# Patient Record
Sex: MALE | Race: WHITE | NOT HISPANIC OR LATINO
[De-identification: names, ages, dates, MRNs, and addresses within clinical notes are randomized per-mention and may not be internally consistent; named-entity substitution may affect disease eponyms.]

---

## 2017-02-15 ENCOUNTER — TRANSCRIPTION ENCOUNTER (OUTPATIENT)
Age: 57
End: 2017-02-15

## 2017-09-04 ENCOUNTER — TRANSCRIPTION ENCOUNTER (OUTPATIENT)
Age: 57
End: 2017-09-04

## 2017-11-21 PROBLEM — Z00.00 ENCOUNTER FOR PREVENTIVE HEALTH EXAMINATION: Status: ACTIVE | Noted: 2017-11-21

## 2018-01-02 ENCOUNTER — APPOINTMENT (OUTPATIENT)
Dept: HEART AND VASCULAR | Facility: CLINIC | Age: 58
End: 2018-01-02
Payer: COMMERCIAL

## 2018-01-02 VITALS — DIASTOLIC BLOOD PRESSURE: 80 MMHG | SYSTOLIC BLOOD PRESSURE: 130 MMHG

## 2018-01-02 VITALS
WEIGHT: 184 LBS | HEART RATE: 72 BPM | BODY MASS INDEX: 24.39 KG/M2 | SYSTOLIC BLOOD PRESSURE: 128 MMHG | DIASTOLIC BLOOD PRESSURE: 80 MMHG | HEIGHT: 73 IN

## 2018-01-02 DIAGNOSIS — Z80.1 FAMILY HISTORY OF MALIGNANT NEOPLASM OF TRACHEA, BRONCHUS AND LUNG: ICD-10-CM

## 2018-01-02 DIAGNOSIS — Z82.49 FAMILY HISTORY OF ISCHEMIC HEART DISEASE AND OTHER DISEASES OF THE CIRCULATORY SYSTEM: ICD-10-CM

## 2018-01-02 DIAGNOSIS — I20.8 OTHER FORMS OF ANGINA PECTORIS: ICD-10-CM

## 2018-01-02 DIAGNOSIS — Z87.898 PERSONAL HISTORY OF OTHER SPECIFIED CONDITIONS: ICD-10-CM

## 2018-01-02 PROCEDURE — 93306 TTE W/DOPPLER COMPLETE: CPT

## 2018-01-02 PROCEDURE — 99204 OFFICE O/P NEW MOD 45 MIN: CPT | Mod: 25

## 2018-01-02 PROCEDURE — 93000 ELECTROCARDIOGRAM COMPLETE: CPT

## 2018-02-02 PROBLEM — Z82.49 FAMILY HISTORY OF PULMONARY EMBOLISM: Status: ACTIVE | Noted: 2018-02-02

## 2018-02-02 PROBLEM — Z80.1 FAMILY HISTORY OF LUNG CANCER: Status: ACTIVE | Noted: 2018-02-02

## 2018-02-02 PROBLEM — Z82.49 FAMILY HISTORY OF HYPERTENSION: Status: ACTIVE | Noted: 2018-02-02

## 2018-04-10 ENCOUNTER — APPOINTMENT (OUTPATIENT)
Dept: PULMONOLOGY | Facility: CLINIC | Age: 58
End: 2018-04-10
Payer: COMMERCIAL

## 2018-04-10 VITALS
BODY MASS INDEX: 24.39 KG/M2 | OXYGEN SATURATION: 95 % | TEMPERATURE: 98.2 F | SYSTOLIC BLOOD PRESSURE: 124 MMHG | DIASTOLIC BLOOD PRESSURE: 80 MMHG | WEIGHT: 184 LBS | HEART RATE: 85 BPM | HEIGHT: 73 IN

## 2018-04-10 DIAGNOSIS — Z86.79 PERSONAL HISTORY OF OTHER DISEASES OF THE CIRCULATORY SYSTEM: ICD-10-CM

## 2018-04-10 DIAGNOSIS — Z78.9 OTHER SPECIFIED HEALTH STATUS: ICD-10-CM

## 2018-04-10 DIAGNOSIS — Z86.39 PERSONAL HISTORY OF OTHER ENDOCRINE, NUTRITIONAL AND METABOLIC DISEASE: ICD-10-CM

## 2018-04-10 DIAGNOSIS — F17.200 NICOTINE DEPENDENCE, UNSPECIFIED, UNCOMPLICATED: ICD-10-CM

## 2018-04-10 PROCEDURE — 94060 EVALUATION OF WHEEZING: CPT

## 2018-04-10 PROCEDURE — 94729 DIFFUSING CAPACITY: CPT

## 2018-04-10 PROCEDURE — 99244 OFF/OP CNSLTJ NEW/EST MOD 40: CPT | Mod: 25

## 2018-04-10 PROCEDURE — 94727 GAS DIL/WSHOT DETER LNG VOL: CPT

## 2018-04-10 PROCEDURE — ZZZZZ: CPT

## 2018-05-01 ENCOUNTER — APPOINTMENT (OUTPATIENT)
Dept: PULMONOLOGY | Facility: CLINIC | Age: 58
End: 2018-05-01
Payer: COMMERCIAL

## 2018-05-01 ENCOUNTER — APPOINTMENT (OUTPATIENT)
Dept: SLEEP CENTER | Facility: HOME HEALTH | Age: 58
End: 2018-05-01

## 2018-05-01 ENCOUNTER — APPOINTMENT (OUTPATIENT)
Dept: CT IMAGING | Facility: HOSPITAL | Age: 58
End: 2018-05-01

## 2018-05-01 VITALS
OXYGEN SATURATION: 95 % | SYSTOLIC BLOOD PRESSURE: 144 MMHG | HEART RATE: 85 BPM | DIASTOLIC BLOOD PRESSURE: 92 MMHG | WEIGHT: 183 LBS | HEIGHT: 73 IN | BODY MASS INDEX: 24.25 KG/M2

## 2018-05-01 VITALS — WEIGHT: 183 LBS | BODY MASS INDEX: 24.14 KG/M2

## 2018-05-01 PROCEDURE — 99406 BEHAV CHNG SMOKING 3-10 MIN: CPT

## 2018-05-01 PROCEDURE — G0296 VISIT TO DETERM LDCT ELIG: CPT

## 2018-05-03 ENCOUNTER — FORM ENCOUNTER (OUTPATIENT)
Age: 58
End: 2018-05-03

## 2018-05-04 ENCOUNTER — OUTPATIENT (OUTPATIENT)
Dept: OUTPATIENT SERVICES | Facility: HOSPITAL | Age: 58
LOS: 1 days | End: 2018-05-04
Payer: COMMERCIAL

## 2018-05-04 ENCOUNTER — APPOINTMENT (OUTPATIENT)
Dept: CT IMAGING | Facility: HOSPITAL | Age: 58
End: 2018-05-04

## 2018-05-04 PROCEDURE — G0297: CPT

## 2018-05-04 PROCEDURE — G0297: CPT | Mod: 26

## 2018-05-25 ENCOUNTER — APPOINTMENT (OUTPATIENT)
Dept: SLEEP CENTER | Facility: HOSPITAL | Age: 58
End: 2018-05-25
Payer: COMMERCIAL

## 2018-05-25 ENCOUNTER — OUTPATIENT (OUTPATIENT)
Dept: OUTPATIENT SERVICES | Facility: HOSPITAL | Age: 58
LOS: 1 days | End: 2018-05-25
Payer: COMMERCIAL

## 2018-05-25 DIAGNOSIS — G47.33 OBSTRUCTIVE SLEEP APNEA (ADULT) (PEDIATRIC): ICD-10-CM

## 2018-05-25 PROCEDURE — 95810 POLYSOM 6/> YRS 4/> PARAM: CPT | Mod: 26

## 2018-05-25 PROCEDURE — 95810 POLYSOM 6/> YRS 4/> PARAM: CPT

## 2018-06-21 ENCOUNTER — OUTPATIENT (OUTPATIENT)
Dept: OUTPATIENT SERVICES | Facility: HOSPITAL | Age: 58
LOS: 1 days | End: 2018-06-21
Payer: COMMERCIAL

## 2018-06-21 ENCOUNTER — APPOINTMENT (OUTPATIENT)
Dept: MRI IMAGING | Facility: HOSPITAL | Age: 58
End: 2018-06-21
Payer: COMMERCIAL

## 2018-06-21 PROCEDURE — 73218 MRI UPPER EXTREMITY W/O DYE: CPT | Mod: 26,LT

## 2018-06-21 PROCEDURE — 73218 MRI UPPER EXTREMITY W/O DYE: CPT

## 2018-06-26 ENCOUNTER — APPOINTMENT (OUTPATIENT)
Dept: PULMONOLOGY | Facility: CLINIC | Age: 58
End: 2018-06-26
Payer: COMMERCIAL

## 2018-06-26 VITALS
HEIGHT: 73 IN | SYSTOLIC BLOOD PRESSURE: 144 MMHG | TEMPERATURE: 98.1 F | OXYGEN SATURATION: 96 % | HEART RATE: 85 BPM | BODY MASS INDEX: 24.52 KG/M2 | WEIGHT: 185 LBS | DIASTOLIC BLOOD PRESSURE: 82 MMHG

## 2018-06-26 DIAGNOSIS — J39.2 OTHER DISEASES OF PHARYNX: ICD-10-CM

## 2018-06-26 DIAGNOSIS — J31.0 CHRONIC RHINITIS: ICD-10-CM

## 2018-06-26 PROCEDURE — 94060 EVALUATION OF WHEEZING: CPT

## 2018-06-26 PROCEDURE — 99215 OFFICE O/P EST HI 40 MIN: CPT | Mod: 25

## 2018-06-26 PROCEDURE — 94729 DIFFUSING CAPACITY: CPT

## 2018-06-26 PROCEDURE — 36415 COLL VENOUS BLD VENIPUNCTURE: CPT

## 2018-09-09 ENCOUNTER — FORM ENCOUNTER (OUTPATIENT)
Age: 58
End: 2018-09-09

## 2018-09-10 ENCOUNTER — OUTPATIENT (OUTPATIENT)
Dept: OUTPATIENT SERVICES | Facility: HOSPITAL | Age: 58
LOS: 1 days | End: 2018-09-10
Payer: COMMERCIAL

## 2018-09-10 ENCOUNTER — APPOINTMENT (OUTPATIENT)
Dept: CT IMAGING | Facility: HOSPITAL | Age: 58
End: 2018-09-10
Payer: COMMERCIAL

## 2018-09-10 PROCEDURE — 71250 CT THORAX DX C-: CPT | Mod: 26

## 2018-09-10 PROCEDURE — 71250 CT THORAX DX C-: CPT

## 2018-09-11 ENCOUNTER — APPOINTMENT (OUTPATIENT)
Dept: PULMONOLOGY | Facility: CLINIC | Age: 58
End: 2018-09-11
Payer: COMMERCIAL

## 2018-09-11 VITALS
DIASTOLIC BLOOD PRESSURE: 80 MMHG | OXYGEN SATURATION: 93 % | BODY MASS INDEX: 23.91 KG/M2 | HEIGHT: 73 IN | SYSTOLIC BLOOD PRESSURE: 120 MMHG | TEMPERATURE: 98.9 F | HEART RATE: 85 BPM | WEIGHT: 180.38 LBS

## 2018-09-11 PROCEDURE — 99215 OFFICE O/P EST HI 40 MIN: CPT | Mod: 25

## 2018-09-11 PROCEDURE — 90686 IIV4 VACC NO PRSV 0.5 ML IM: CPT

## 2018-09-11 PROCEDURE — G0008: CPT

## 2018-12-11 ENCOUNTER — APPOINTMENT (OUTPATIENT)
Dept: MRI IMAGING | Facility: HOSPITAL | Age: 58
End: 2018-12-11

## 2018-12-11 ENCOUNTER — OUTPATIENT (OUTPATIENT)
Dept: OUTPATIENT SERVICES | Facility: HOSPITAL | Age: 58
LOS: 1 days | End: 2018-12-11
Payer: COMMERCIAL

## 2018-12-11 PROCEDURE — 72148 MRI LUMBAR SPINE W/O DYE: CPT

## 2018-12-11 PROCEDURE — 72148 MRI LUMBAR SPINE W/O DYE: CPT | Mod: 26

## 2018-12-18 ENCOUNTER — APPOINTMENT (OUTPATIENT)
Dept: PULMONOLOGY | Facility: CLINIC | Age: 58
End: 2018-12-18
Payer: COMMERCIAL

## 2018-12-18 VITALS
SYSTOLIC BLOOD PRESSURE: 110 MMHG | DIASTOLIC BLOOD PRESSURE: 80 MMHG | HEART RATE: 76 BPM | HEIGHT: 73 IN | TEMPERATURE: 98.7 F | WEIGHT: 180 LBS | OXYGEN SATURATION: 96 % | BODY MASS INDEX: 23.86 KG/M2

## 2018-12-18 PROCEDURE — 94060 EVALUATION OF WHEEZING: CPT

## 2018-12-18 PROCEDURE — 94729 DIFFUSING CAPACITY: CPT

## 2018-12-18 PROCEDURE — 99215 OFFICE O/P EST HI 40 MIN: CPT

## 2018-12-18 RX ORDER — BUDESONIDE AND FORMOTEROL FUMARATE DIHYDRATE 160; 4.5 UG/1; UG/1
160-4.5 AEROSOL RESPIRATORY (INHALATION)
Refills: 0 | Status: DISCONTINUED | COMMUNITY
Start: 2018-06-26 | End: 2018-12-18

## 2018-12-18 RX ORDER — FLUTICASONE PROPIONATE 50 UG/1
50 SPRAY, METERED NASAL TWICE DAILY
Qty: 1 | Refills: 1 | Status: DISCONTINUED | COMMUNITY
Start: 2018-06-26 | End: 2018-12-18

## 2018-12-18 RX ORDER — METHOTREXATE 2.5 MG/1
2.5 TABLET ORAL
Refills: 0 | Status: DISCONTINUED | COMMUNITY
End: 2018-12-18

## 2018-12-18 RX ORDER — FOLIC ACID 1 MG/1
1 TABLET ORAL
Refills: 0 | Status: DISCONTINUED | COMMUNITY
End: 2018-12-18

## 2018-12-18 RX ORDER — NICOTINE 21 MG/24H
21 PATCH, EXTENDED RELEASE TRANSDERMAL
Qty: 1 | Refills: 5 | Status: DISCONTINUED | COMMUNITY
Start: 2018-04-10 | End: 2018-12-18

## 2019-04-11 ENCOUNTER — NON-APPOINTMENT (OUTPATIENT)
Age: 59
End: 2019-04-11

## 2019-04-11 ENCOUNTER — APPOINTMENT (OUTPATIENT)
Dept: PULMONOLOGY | Facility: CLINIC | Age: 59
End: 2019-04-11
Payer: COMMERCIAL

## 2019-04-11 VITALS
TEMPERATURE: 99.3 F | WEIGHT: 180 LBS | SYSTOLIC BLOOD PRESSURE: 110 MMHG | OXYGEN SATURATION: 97 % | RESPIRATION RATE: 12 BRPM | HEART RATE: 70 BPM | DIASTOLIC BLOOD PRESSURE: 80 MMHG | HEIGHT: 73 IN | BODY MASS INDEX: 23.86 KG/M2

## 2019-04-11 PROCEDURE — 94010 BREATHING CAPACITY TEST: CPT

## 2019-04-11 PROCEDURE — 99215 OFFICE O/P EST HI 40 MIN: CPT | Mod: 25

## 2019-04-11 PROCEDURE — 36415 COLL VENOUS BLD VENIPUNCTURE: CPT

## 2019-04-11 RX ORDER — VARENICLINE TARTRATE 1 MG/1
1 TABLET, FILM COATED ORAL TWICE DAILY
Qty: 1 | Refills: 1 | Status: DISCONTINUED | COMMUNITY
Start: 2018-04-10 | End: 2019-04-11

## 2019-04-11 RX ORDER — VARENICLINE TARTRATE 0.5 (11)-1
0.5 MG X 11 & KIT ORAL
Qty: 1 | Refills: 0 | Status: DISCONTINUED | COMMUNITY
Start: 2018-04-10 | End: 2019-04-11

## 2019-04-14 LAB
CH50 SERPL-MCNC: 45 U/ML
CRP SERPL-MCNC: <0.1 MG/DL
ENA RNP AB SER IA-ACNC: <0.2 AL
ENA SM AB SER IA-ACNC: <0.2 AL
RHEUMATOID FACT SER QL: 226 IU/ML

## 2019-04-14 NOTE — PHYSICAL EXAM
[General Appearance - Well Developed] : well developed [Normal Appearance] : normal appearance [Well Groomed] : well groomed [General Appearance - Well Nourished] : well nourished [Normal Conjunctiva] : the conjunctiva exhibited no abnormalities [II] : II [Heart Rate And Rhythm] : heart rate and rhythm were normal [Heart Sounds] : normal S1 and S2 [Murmurs] : no murmurs present [Arterial Pulses Normal] : the arterial pulses were normal [Edema] : no peripheral edema present [Respiration, Rhythm And Depth] : normal respiratory rhythm and effort [Exaggerated Use Of Accessory Muscles For Inspiration] : no accessory muscle use [Auscultation Breath Sounds / Voice Sounds] : lungs were clear to auscultation bilaterally [Lungs Percussion] : the lungs were normal to percussion [Abdomen Soft] : soft [Bowel Sounds] : normal bowel sounds [Abnormal Walk] : normal gait [Gait - Sufficient For Exercise Testing] : the gait was sufficient for exercise testing [Nail Clubbing] : no clubbing of the fingernails [Cyanosis, Localized] : no localized cyanosis [Skin Color & Pigmentation] : normal skin color and pigmentation [] : no rash [No Focal Deficits] : no focal deficits [Oriented To Time, Place, And Person] : oriented to person, place, and time [FreeTextEntry1] : good air entry, no wheezing, rhonchi or crackles  [FreeTextEntry2] : No pedal edema

## 2019-04-14 NOTE — DISCUSSION/SUMMARY
[FreeTextEntry1] : Attending's summary:\par 4-11-19\par No cigarette smell noted \par -no pallor, no icterus\par -no oral thrush, no lesions noted, congestion in both nostrils with significant narrowing, no discharge\par -no cervical lymphadenopathy, no JVD at 45 degrees, no hepatojugular reflux\par -P2 not loud \par -good air entry, no wheezing, rhonchi or crackles \par -No pedal edema\par -No articular manifestations of rheumatoid arthritis\par \par Spirometry today is normal.    \par \par

## 2019-04-14 NOTE — CONSULT LETTER
[Dear  ___] : Dear ~PIERRE, [Consult Letter:] : I had the pleasure of evaluating your patient, [unfilled]. [Please see my note below.] : Please see my note below. [Consult Closing:] : Thank you very much for allowing me to participate in the care of this patient.  If you have any questions, please do not hesitate to contact me. [Sincerely,] : Sincerely, [DrTerrence  ___] : Dr. PELAEZ [FreeTextEntry2] : Dr. Lauren Bain  [FreeTextEntry3] : Julio Rocha MD

## 2019-04-14 NOTE — REVIEW OF SYSTEMS
[As Noted in HPI] : as noted in HPI [Negative] : Pulmonary Hypertension [Cough] : no cough [Sputum] : not coughing up ~M sputum [Hemoptysis] : no hemoptysis [Dyspnea] : no dyspnea [Chest Tightness] : no chest tightness [Wheezing] : no wheezing [Pleuritic Pain] : no pleuritic pain

## 2019-04-14 NOTE — HISTORY OF PRESENT ILLNESS
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 \par He smokes 1 pack a day since 14 yrs old\par He had failed smoking cessation in the past - he has used nicotine patches and abruptly stopping in the past and has stopped for a few months at a time. He has not tried chantix or other oral pills.\par He denies any cough now, had bronchitis about 1 month ago - this has resolved.\par He denies SOB. \par May have asbestos exposure in his jobs, but he is not sure. \par He is not ready to quit smoking until he comes back from [place] in August. He is not interested in electronic cigarettes.\par Mother  of PE developed after a flight, had a brother  from lung cancer in his 40s\par \par 19:\par He states he mostly quit smoking since 2019. He states he has only smoked 2-3 cigarettes total since that date.  He is vaping, which contains some nicotine. He is not using Chantix or other forms of nicotine replacement aside from the vaping device. \par He feels that his sleep is better and his headaches are less since he stopped regular smoking.\par He had serologies done with Dr. Tillman on 18. RF was elevated at 126.5. LDH was WNL at 172. CRP and ESR were WNL. \par Continues with back, has received 3 epidurals and now getting shots. \par Denies SOB; he states he can walk as much as he'd like on a flat surface.  Denies cough, fever, wheezing, chest tightness.

## 2019-04-14 NOTE — ASSESSMENT
[FreeTextEntry1] : 4/11/19:\par Lucho's respiratory issues are summarized:\par \par 1.  Mild obstructive airways disease \par He has stopped smoking. His FEV1/FVC is normal today at 73%.  He is not having significant exacerbations on a regular basis; therefore he is at low risk for exacerbation.   We have asked Lucho to start Utibron at this time.\par \par 2.    Smoking Cessation \par He has stopped daily cigarette smoking on 2/20/19. He has had only 2-3 cigarettes since that date.  He has been vaping and is not using the prescribed smoking cessation aids. We have asked him to use Nicotrol inhalation systems instead of vaping and have encouraged him to remain off cigarettes.\par \par 3. Incidental lung abnormalities (SERGIO)\par He had ground glass haziness noted in the dorsal areas of the lungs.   However when we requested him to lie in the prone position during his CT chest from 9/10/18, these opacities disappeared.   Therefore I do not feel that he has incidental lung abnormalities.  These are probably gravity dependent atelectasis\par \par 4.   Lung cancer screening \par His Lung Rads score is 1.    His next low dose CT will be in done in September 2019. \par \par 5.   Rheumatoid Arthritis  \par No clinical evidence of ILD associated with RA.  He had serologies done with Dr. Tillman on 12/18/18. RF was elevated at 126.5. LDH was WNL at 172. CRP and ESR were WNL.  We will assess serologies today, including anti-CCP.\par \par 6.   DANIE \par AHI was 9.8 on sleep study in 05/2018.  Minimum SpO2 was 88%.  He feels he is sleeping better since he stopped smoking.  If pt develops daytime somnolence or snoring, we will re-evaluate the need for treatment.   \par \par Return visit 3 months

## 2019-04-14 NOTE — PROCEDURE
[FreeTextEntry1] : Spirometry 19:\par FVC: 4.82 L (92%)\par FEV1: 3.51 L (88%)\par FEV1/FVC: 73%\par [id number]%: 2.41 L/s (74%)\par \par [place], DLCO 18:\par FVC: 4.85 L (99%) --> 5.20 L (107%)\par FEV1: 3.27 L (84%) --> 3.42 L (88%)\par FEV1/FVC: 68% --> 66%\par [id number]%: 1.76 L/s (45%) --> 1.62 L/s (41%) \par DLCO: 26.3 (109%)\par Mild airflow obstruction. Normal diffusion capacity. \par \par EXAM: CT CHEST PROCEDURE DATE: 09/10/2018 \par INTERPRETATION: CT SCAN OF CHEST \par History: Follow-up of groundglass opacities. \par Comparison: Chest CT from 2018 and CT scan of abdomen and pelvis from 2011. \par Findings: \par Lungs and large airways: There is again peripherally located groundglass opacity in the dependent portions of each lower lobe on the supine images. This abnormality resolves on the prone images. Therefore, the groundglass opacity is consistent with dependent atelectasis and not interstitial lung \par disease. \par There is again a small amount of mucus within the trachea. Mild centrilobular emphysema. No change noncalcified micronodules in each upper lobe. No change calcified micronodules in each lower lobe. \par Pleura: No pleural effusion. \par Mediastinum and hilar regions: No thoracic lymphadenopathy. \par Heart and pericardium: Heart size is normal. No pericardial effusion. \par Vessels: Mild coronary artery calcification and mild calcified plaque thoracic aorta. There is again mild thoracic aortic aneurysm, with aortic root measuring 4.2 cm, ascending aorta 3.7 cm, aortic arch 3.5 cm, and descending aorta 3.8 cm. \par Chest wall and lower neck: Normal. \par Upper abdomen: Normal. \par Bones: Degenerative changes right shoulder. \par Impression: The peripherally located groundglass opacities in the dependent portions of each lower lobe on the supine images resolve on the prone images, consistent with dependent atelectasis. No evidence of interstitial lung disease. \par \par Spirometry and DLCO 18:\par FVC: 4.87 L (99%) --> 5.20 L (106%)\par FEV1: 3.31 L (84%) --> 3.37 L (86%)\par FEV1/FVC; 68% --> 65%\par [id number]%: 1.62 L/s (41%) --> 1.53 L/s (39%)\par DLCO: 26.6 (109%)\par Mild obstructive defect.  Normal diffusion capacity.\par \par Attended sleep study 18:\par AHI CMS 9.8\par min SpO2 88%\par PLMS arousal index 2.2\par NSR predominant on ECG\par \par EXAM: CT LDCT LUNG CA SCRN BASELINE   PROCEDURE DATE: 2018 \par History: Current smoker with 44 pack year smoking history. \par Comparison: CT scan of abdomen and pelvis from 2011. \par \par Findings: \par Lungs and airways: Image numbers for description of nodule location refer to thin section axial series number 4. \par 3 mm solid nodule apical segment right upper lobe, image 57. 2 mm pleural-based nodule apical posterior segment left upper lobe, image 50. 2 mm solid nodule apical posterior segment left upper lobe, image 77. \par There is a small amount of mucoid material in the right upper trachea and in the right lower trachea near the raghu. There is bronchial wall thickening bilaterally. There is mild centrilobular emphysema bilaterally. There is again groundglass opacity in the dependent portions of each lower lobe. \par Pleura: The pleural spaces are clear. \par Base of neck, mediastinum and heart: The thyroid gland is normal. No mediastinal, hilar or axillary lymphadenopathy is seen. The heart and pericardium are within normal limits. Small calcified plaque aorta. The thoracic aorta is aneurysmal, with the aortic root measuring 4.1 cm, the ascending aorta 3.9 cm, the aortic arch 3.6 cm, and the descending aorta 3.8 cm. \par Coronary artery calcification: Mild. \par Soft tissues: Normal. \par Abdomen: This study was performed without contrast and with lower than standard dose. These factors reduce sensitivity for detection of small lesions in the upper abdomen. Given these technical limitations, no focal lesion is seen within the visualized organs. \par Bones: There are degenerative changes of the right shoulder. \par \par Impression: 1. There are a few micronodules in the upper lobes. \par 2. Mild emphysema. \par 3. Groundglass opacity in the dependent portion of each lower lobe. This could represent dependent atelectasis. However, similar finding was present on prior study. The differential would include interstitial lung disease. \par 4. Mildly aneurysmal thoracic aorta, measuring up to 4.1 cm in the aortic root. \par \par Lung-RADS category: 2S - Benign appearance or behavior. Nodules with very \par low likelihood of becoming a clinically active cancer due to size or lack of \par growth. Probability of malignancy < 1%. \par \par Recommendation: \par 1. Continue annual screening with LDCT in 12 months. \par 2. Recommend follow-up evaluation of thoracic aortic aneurysm. \par 3. Recommend correlation with pulmonary function tests due to possibility of interstitial lung disease. Recommend prone imaging be performed at time of next CT scan. \par \par 04/10/18 PFT:\par FVC: 4.87L (92%) --> 5.20L (98%)\par FEV1: 3.23L (81%) --> 3.45L (86%)\par FEV1/FVC: 66\par T.24L (125%)\par DLCO: 102%

## 2019-04-14 NOTE — END OF VISIT
[>50% of Time Spent on Counseling and Coordination of Care for  ___] : Greater than 50% of the encounter time was spent on counseling and coordination of care for [unfilled] [Time Spent: ___ minutes] : I have spent [unfilled] minutes of face to face time with the patient [FreeTextEntry3] : All medical record entries made by the Scribe were at my, Dr. Julio Rocha's direction and personally dictated by me on 4/11/19. I have reviewed the chart and agree that the record accurately reflects my personal performance of the history, physical exam, assessment and plan. I have also personally directed, reviewed, and agreed with the chart.

## 2019-04-19 LAB
ANA SER IF-ACNC: NEGATIVE
CCP AB SER IA-ACNC: >250 UNITS
RF+CCP IGG SER-IMP: ABNORMAL

## 2019-05-03 ENCOUNTER — APPOINTMENT (OUTPATIENT)
Dept: RHEUMATOLOGY | Facility: CLINIC | Age: 59
End: 2019-05-03
Payer: COMMERCIAL

## 2019-05-03 VITALS
BODY MASS INDEX: 24.52 KG/M2 | OXYGEN SATURATION: 97 % | HEART RATE: 86 BPM | WEIGHT: 185 LBS | SYSTOLIC BLOOD PRESSURE: 150 MMHG | TEMPERATURE: 98.8 F | HEIGHT: 73 IN | DIASTOLIC BLOOD PRESSURE: 83 MMHG

## 2019-05-03 PROCEDURE — 36415 COLL VENOUS BLD VENIPUNCTURE: CPT

## 2019-05-03 PROCEDURE — 99205 OFFICE O/P NEW HI 60 MIN: CPT | Mod: 25

## 2019-05-03 NOTE — PHYSICAL EXAM
[General Appearance - Alert] : alert [General Appearance - Well Nourished] : well nourished [General Appearance - In No Acute Distress] : in no acute distress [General Appearance - Well Developed] : well developed [General Appearance - Well-Appearing] : healthy appearing [Oropharynx] : the oropharynx was normal [Extraocular Movements] : extraocular movements were intact [Respiration, Rhythm And Depth] : normal respiratory rhythm and effort [Heart Rate And Rhythm] : heart rate was normal and rhythm regular [Auscultation Breath Sounds / Voice Sounds] : lungs were clear to auscultation bilaterally [Heart Sounds] : normal S1 and S2 [Heart Sounds Pericardial Friction Rub] : no pericardial rub [Murmurs] : no murmurs [Heart Sounds Gallop] : no gallops [Abdomen Tenderness] : non-tender [Edema] : there was no peripheral edema [Abdomen Soft] : soft [No Spinal Tenderness] : no spinal tenderness [Motor Tone] : muscle strength and tone were normal [Skin Color & Pigmentation] : normal skin color and pigmentation [Musculoskeletal - Swelling] : no joint swelling seen [No Focal Deficits] : no focal deficits [Skin Lesions] : no skin lesions [] : no rash [Mood] : the mood was normal [Affect] : the affect was normal [Oriented To Time, Place, And Person] : oriented to person, place, and time

## 2019-05-03 NOTE — REVIEW OF SYSTEMS
[Feeling Tired] : feeling tired [Joint Pain] : joint pain [Arthralgias] : arthralgias [Joint Swelling] : joint swelling [Fever] : no fever [Chills] : no chills [Eye Pain] : no eye pain [Red Eyes] : eyes not red [Dry Eyes] : no dryness of the eyes [Chest Pain] : no chest pain [Shortness Of Breath] : no shortness of breath [Abdominal Pain] : no abdominal pain [Skin Lesions] : no skin lesions

## 2019-05-03 NOTE — ADDENDUM
[FreeTextEntry1] : Patient seen and examined with Dr. Nunes\par Agree with history, physical exam, assessment and plan as described above with the exception of the following additions and changes:\par 58 year old male with a history of seropositive RA presents for evaluation. \par Patient has not had any clinically significant synovitis and has never been on therapy. Further, he denies multiple joint involvement, moreso that he has an episode of pain in one joint that resolves, then remits a few weeks later in another. This is most consistent with palindromic rheumatism and explains why he has not developed deformities yet. Given that he is a smoker and has +RF he is at increased risk of developing deformities therefore would recommend he takes plaquenil to prevent progression, given good evidence behind this. \par Will check inflammatory markers, XRs and serologies first. \par \par

## 2019-05-03 NOTE — ASSESSMENT
[FreeTextEntry1] : 59yo M with a PMHx of HTN, DM, COPD not on home O2, DANIE and RA who comes to clinic to establish care and further assessment of arthritis. \par \par # Arthritis. Patient with multiple joint pains, mostly bilateral wrists, MCP and PIP over the past few years, with intermittent swelling of the joints but none at this moment, and no deformations. 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 Both RF and anti-CCP are elevated to 226, and >250 respectively. \par - Will check xrays of hands and wrists.\par - Labs ordered including RF, anti-CCP, complements, CBC, CMP, ESR, CRP, and immunoglobulins. \par - Will discuss tx options based on xrays/labs results.

## 2019-05-03 NOTE — HISTORY OF PRESENT ILLNESS
[Arthralgias] : arthralgias [Fatigue] : fatigue [FreeTextEntry1] : Patient is a 59yo M with a PMHx of HTN, DM, COPD not on home O2, DANIE and RA who comes to clinic to establish care and further assessment of arthritis. \par \par He states he was diagnosed to RA several years ago and was following with Dr. Bain for about a year until 6months ago. He thinks he was in some sort of regimen but he does not recall what. \par \par He complaints mostly of bilateral hand and wrist pain which is worse at night. States he "maybe" gets morning stiffness but does not give a straight answer as of how long. Has not notice any swelling of his joints. Denies taking any medication to help with his symptoms. \par \par Denies FH of autoimmune disease. No hx of blood clots.  [Weight Loss] : no weight loss [Fever] : no fever [Chills] : no chills [Malar Facial Rash] : no malar facial rash [Skin Lesions] : no lesions [Skin Nodules] : no skin nodules [Oral Ulcers] : no oral ulcers [Shortness of Breath] : no shortness of breath [Chest Pain] : no chest pain [Joint Deformity] : no joint deformity

## 2019-05-07 ENCOUNTER — OUTPATIENT (OUTPATIENT)
Dept: OUTPATIENT SERVICES | Facility: HOSPITAL | Age: 59
LOS: 1 days | End: 2019-05-07
Payer: COMMERCIAL

## 2019-05-07 PROCEDURE — 73130 X-RAY EXAM OF HAND: CPT | Mod: 26,50

## 2019-05-07 PROCEDURE — 73130 X-RAY EXAM OF HAND: CPT

## 2019-05-08 LAB
ALBUMIN SERPL ELPH-MCNC: 4.5 G/DL
ALP BLD-CCNC: 69 U/L
ALT SERPL-CCNC: 28 U/L
ANA SER IF-ACNC: NEGATIVE
ANION GAP SERPL CALC-SCNC: 15 MMOL/L
AST SERPL-CCNC: 20 U/L
BASOPHILS # BLD AUTO: 0.05 K/UL
BASOPHILS NFR BLD AUTO: 0.8 %
BILIRUB SERPL-MCNC: 0.4 MG/DL
BUN SERPL-MCNC: 14 MG/DL
C3 SERPL-MCNC: 112 MG/DL
C4 SERPL-MCNC: 30 MG/DL
CALCIUM SERPL-MCNC: 9.6 MG/DL
CHLORIDE SERPL-SCNC: 104 MMOL/L
CO2 SERPL-SCNC: 22 MMOL/L
CREAT SERPL-MCNC: 0.69 MG/DL
EOSINOPHIL # BLD AUTO: 0.16 K/UL
EOSINOPHIL NFR BLD AUTO: 2.5 %
ERYTHROCYTE [SEDIMENTATION RATE] IN BLOOD BY WESTERGREN METHOD: 16 MM/HR
GLUCOSE SERPL-MCNC: 127 MG/DL
HCT VFR BLD CALC: 44 %
HGB BLD-MCNC: 14.7 G/DL
IGA SER QL IEP: 284 MG/DL
IGG SER QL IEP: 1008 MG/DL
IGM SER QL IEP: 139 MG/DL
IMM GRANULOCYTES NFR BLD AUTO: 0.3 %
LYMPHOCYTES # BLD AUTO: 1.86 K/UL
LYMPHOCYTES NFR BLD AUTO: 29.2 %
MAN DIFF?: NORMAL
MCHC RBC-ENTMCNC: 33 PG
MCHC RBC-ENTMCNC: 33.4 GM/DL
MCV RBC AUTO: 98.9 FL
MONOCYTES # BLD AUTO: 0.69 K/UL
MONOCYTES NFR BLD AUTO: 10.8 %
NEUTROPHILS # BLD AUTO: 3.6 K/UL
NEUTROPHILS NFR BLD AUTO: 56.4 %
PLATELET # BLD AUTO: 183 K/UL
POTASSIUM SERPL-SCNC: 4.2 MMOL/L
PROT SERPL-MCNC: 7.2 G/DL
RBC # BLD: 4.45 M/UL
RBC # FLD: 12.7 %
SODIUM SERPL-SCNC: 141 MMOL/L
WBC # FLD AUTO: 6.38 K/UL

## 2019-06-05 ENCOUNTER — RX CHANGE (OUTPATIENT)
Age: 59
End: 2019-06-05

## 2019-06-05 RX ORDER — HYDROXYCHLOROQUINE SULFATE 200 MG/1
200 TABLET, FILM COATED ORAL TWICE DAILY
Qty: 60 | Refills: 11 | Status: DISCONTINUED | COMMUNITY
Start: 2019-05-08 | End: 2019-06-05

## 2019-06-05 RX ORDER — HYDROXYCHLOROQUINE SULFATE 200 MG/1
200 TABLET, FILM COATED ORAL
Qty: 180 | Refills: 4 | Status: ACTIVE | COMMUNITY
Start: 2019-06-05 | End: 1900-01-01

## 2019-07-16 ENCOUNTER — APPOINTMENT (OUTPATIENT)
Dept: PULMONOLOGY | Facility: CLINIC | Age: 59
End: 2019-07-16

## 2019-10-11 ENCOUNTER — APPOINTMENT (OUTPATIENT)
Dept: CT IMAGING | Facility: HOSPITAL | Age: 59
End: 2019-10-11

## 2019-10-11 ENCOUNTER — APPOINTMENT (OUTPATIENT)
Dept: PULMONOLOGY | Facility: CLINIC | Age: 59
End: 2019-10-11
Payer: COMMERCIAL

## 2019-10-11 VITALS
TEMPERATURE: 97.7 F | WEIGHT: 192 LBS | HEART RATE: 84 BPM | BODY MASS INDEX: 25.45 KG/M2 | OXYGEN SATURATION: 94 % | DIASTOLIC BLOOD PRESSURE: 80 MMHG | SYSTOLIC BLOOD PRESSURE: 132 MMHG | HEIGHT: 73 IN

## 2019-10-11 DIAGNOSIS — Z87.891 PERSONAL HISTORY OF NICOTINE DEPENDENCE: ICD-10-CM

## 2019-10-11 DIAGNOSIS — F17.210 NICOTINE DEPENDENCE, CIGARETTES, UNCOMPLICATED: ICD-10-CM

## 2019-10-11 PROCEDURE — G0296 VISIT TO DETERM LDCT ELIG: CPT

## 2019-12-17 ENCOUNTER — APPOINTMENT (OUTPATIENT)
Dept: PULMONOLOGY | Facility: CLINIC | Age: 59
End: 2019-12-17
Payer: COMMERCIAL

## 2019-12-17 ENCOUNTER — NON-APPOINTMENT (OUTPATIENT)
Age: 59
End: 2019-12-17

## 2019-12-17 VITALS
WEIGHT: 190 LBS | SYSTOLIC BLOOD PRESSURE: 110 MMHG | TEMPERATURE: 97.4 F | BODY MASS INDEX: 25.18 KG/M2 | OXYGEN SATURATION: 93 % | HEIGHT: 73 IN | HEART RATE: 86 BPM | DIASTOLIC BLOOD PRESSURE: 70 MMHG

## 2019-12-17 DIAGNOSIS — Z23 ENCOUNTER FOR IMMUNIZATION: ICD-10-CM

## 2019-12-17 PROCEDURE — 99215 OFFICE O/P EST HI 40 MIN: CPT | Mod: 25

## 2019-12-17 PROCEDURE — 94010 BREATHING CAPACITY TEST: CPT

## 2019-12-17 PROCEDURE — 90686 IIV4 VACC NO PRSV 0.5 ML IM: CPT

## 2019-12-17 PROCEDURE — G0008: CPT

## 2019-12-17 RX ORDER — NICOTINE 4 MG/1
10 INHALANT RESPIRATORY (INHALATION)
Qty: 1 | Refills: 2 | Status: DISCONTINUED | COMMUNITY
Start: 2019-04-11 | End: 2019-12-17

## 2019-12-17 RX ORDER — INDACATEROL MALEATE AND GLYCOPYRROLATE 27.5; 15.6 UG/1; UG/1
27.5-15.6 CAPSULE RESPIRATORY (INHALATION) TWICE DAILY
Qty: 1 | Refills: 3 | Status: ACTIVE | COMMUNITY
Start: 2019-04-11 | End: 1900-01-01

## 2019-12-18 NOTE — PROCEDURE
[FreeTextEntry1] : Spirometry 19: \par FVC: 5.06 L (97%)\par FEV1: 3.37 L (85%)\par FEV1/FVC: 67%\par [id number]%: 2.04 L/s (64%)\par Normal FVC. Mild obstructive defect. \par \par CT chest 10/25/19: There is some mild centrilobular emphysema in the apices. There is cylindrical bronchiectasis. No suspicious lung nodules were appreciated. \par \par Spirometry 19:\par FVC: 4.82 L (92%)\par FEV1: 3.51 L (88%)\par FEV1/FVC: 73%\par [id number]%: 2.41 L/s (74%)\par \par [place], DLCO 18:\par FVC: 4.85 L (99%) --> 5.20 L (107%)\par FEV1: 3.27 L (84%) --> 3.42 L (88%)\par FEV1/FVC: 68% --> 66%\par [id number]%: 1.76 L/s (45%) --> 1.62 L/s (41%) \par DLCO: 26.3 (109%)\par Mild airflow obstruction. Normal diffusion capacity. \par \par EXAM: CT CHEST PROCEDURE DATE: 09/10/2018 \par INTERPRETATION: CT SCAN OF CHEST \par History: Follow-up of groundglass opacities. \par Comparison: Chest CT from 2018 and CT scan of abdomen and pelvis from 2011. \par Findings: \par Lungs and large airways: There is again peripherally located groundglass opacity in the dependent portions of each lower lobe on the supine images. This abnormality resolves on the prone images. Therefore, the groundglass opacity is consistent with dependent atelectasis and not interstitial lung \par disease. \par There is again a small amount of mucus within the trachea. Mild centrilobular emphysema. No change noncalcified micronodules in each upper lobe. No change calcified micronodules in each lower lobe. \par Pleura: No pleural effusion. \par Mediastinum and hilar regions: No thoracic lymphadenopathy. \par Heart and pericardium: Heart size is normal. No pericardial effusion. \par Vessels: Mild coronary artery calcification and mild calcified plaque thoracic aorta. There is again mild thoracic aortic aneurysm, with aortic root measuring 4.2 cm, ascending aorta 3.7 cm, aortic arch 3.5 cm, and descending aorta 3.8 cm. \par Chest wall and lower neck: Normal. \par Upper abdomen: Normal. \par Bones: Degenerative changes right shoulder. \par Impression: The peripherally located groundglass opacities in the dependent portions of each lower lobe on the supine images resolve on the prone images, consistent with dependent atelectasis. No evidence of interstitial lung disease. \par \par Spirometry and DLCO 18:\par FVC: 4.87 L (99%) --> 5.20 L (106%)\par FEV1: 3.31 L (84%) --> 3.37 L (86%)\par FEV1/FVC; 68% --> 65%\par [id number]%: 1.62 L/s (41%) --> 1.53 L/s (39%)\par DLCO: 26.6 (109%)\par Mild obstructive defect.  Normal diffusion capacity.\par \par Attended sleep study 18:\par AHI CMS 9.8\par min SpO2 88%\par PLMS arousal index 2.2\par NSR predominant on ECG\par \par EXAM: CT LDCT LUNG CA SCRN BASELINE   PROCEDURE DATE: 2018 \par History: Current smoker with 44 pack year smoking history. \par Comparison: CT scan of abdomen and pelvis from 2011. \par \par Findings: \par Lungs and airways: Image numbers for description of nodule location refer to thin section axial series number 4. \par 3 mm solid nodule apical segment right upper lobe, image 57. 2 mm pleural-based nodule apical posterior segment left upper lobe, image 50. 2 mm solid nodule apical posterior segment left upper lobe, image 77. \par There is a small amount of mucoid material in the right upper trachea and in the right lower trachea near the raghu. There is bronchial wall thickening bilaterally. There is mild centrilobular emphysema bilaterally. There is again groundglass opacity in the dependent portions of each lower lobe. \par Pleura: The pleural spaces are clear. \par Base of neck, mediastinum and heart: The thyroid gland is normal. No mediastinal, hilar or axillary lymphadenopathy is seen. The heart and pericardium are within normal limits. Small calcified plaque aorta. The thoracic aorta is aneurysmal, with the aortic root measuring 4.1 cm, the ascending aorta 3.9 cm, the aortic arch 3.6 cm, and the descending aorta 3.8 cm. \par Coronary artery calcification: Mild. \par Soft tissues: Normal. \par Abdomen: This study was performed without contrast and with lower than standard dose. These factors reduce sensitivity for detection of small lesions in the upper abdomen. Given these technical limitations, no focal lesion is seen within the visualized organs. \par Bones: There are degenerative changes of the right shoulder. \par \par Impression: 1. There are a few micronodules in the upper lobes. \par 2. Mild emphysema. \par 3. Groundglass opacity in the dependent portion of each lower lobe. This could represent dependent atelectasis. However, similar finding was present on prior study. The differential would include interstitial lung disease. \par 4. Mildly aneurysmal thoracic aorta, measuring up to 4.1 cm in the aortic root. \par \par Lung-RADS category: 2S - Benign appearance or behavior. Nodules with very \par low likelihood of becoming a clinically active cancer due to size or lack of \par growth. Probability of malignancy < 1%. \par \par Recommendation: \par 1. Continue annual screening with LDCT in 12 months. \par 2. Recommend follow-up evaluation of thoracic aortic aneurysm. \par 3. Recommend correlation with pulmonary function tests due to possibility of interstitial lung disease. Recommend prone imaging be performed at time of next CT scan. \par \par 04/10/18 PFT:\par FVC: 4.87L (92%) --> 5.20L (98%)\par FEV1: 3.23L (81%) --> 3.45L (86%)\par FEV1/FVC: 66\par T.24L (125%)\par DLCO: 102%

## 2019-12-18 NOTE — PHYSICAL EXAM
[III] : III [Abdomen Tenderness] : non-tender [] : no hepato-splenomegaly [Affect] : the affect was normal [Memory Recent] : recent memory was not impaired [FreeTextEntry1] : grade 1 clubbing; no articular manifestations of rheumatologic disease [FreeTextEntry2] : No pedal edema

## 2019-12-18 NOTE — END OF VISIT
[FreeTextEntry3] : All medical record entries made by the Scribe were at my, Dr. Julio Rocha's direction and personally dictated by me on 4/11/19. I have reviewed the chart and agree that the record accurately reflects my personal performance of the history, physical exam, assessment and plan. I have also personally directed, reviewed, and agreed with the chart.

## 2019-12-18 NOTE — REVIEW OF SYSTEMS
[Back Pain] : ~T back pain [Cough] : no cough [Sputum] : not coughing up ~M sputum [Hemoptysis] : no hemoptysis [Pleuritic Pain] : no pleuritic pain [Dyspnea] : no dyspnea [Chest Tightness] : no chest tightness [Wheezing] : no wheezing

## 2019-12-18 NOTE — DISCUSSION/SUMMARY
[FreeTextEntry1] : Attending's summary:\par 12-17-19\par -no pallor, no icterus\par -MP 3, no oral thrush, congestion in both nostrils with significant narrowing, no discharge\par -no cervical lymphadenopathy, no JVD at 45 degrees, no hepatojugular reflux\par -P2 not loud, normal S1, S2 \par -no dullness, good air entry, no wheezing, rhonchi or crackles, few inspiratory crackles at the extreme right base posteriorly\par -no clinically detected organomegaly\par -No pedal edema\par -No articular manifestations of rheumatoid arthritis\par -grade 1 clubbing \par \par CT chest 10/25/19: There is some mild centrilobular emphysema in the apices. There is cylindrical bronchiectasis. No suspicious lung nodules were appreciated. \par

## 2019-12-18 NOTE — HISTORY OF PRESENT ILLNESS
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 \par He smokes 1 pack a day since 14 yrs old\par He had failed smoking cessation in the past - he has used nicotine patches and abruptly stopping in the past and has stopped for a few months at a time. He has not tried chantix or other oral pills.\par He denies any cough now, had bronchitis about 1 month ago - this has resolved.\par He denies SOB. \par May have asbestos exposure in his jobs, but he is not sure. \par He is not ready to quit smoking until he comes back from [place] in August. He is not interested in electronic cigarettes.\par Mother  of PE developed after a flight, had a brother  from lung cancer in his 40s\par \par -:\par He states he mostly quit smoking since 2019. He states he has only smoked 3-4 cigarettes since that date.  He is vaping. He is not using Chantix or other forms of nicotine replacement aside from the vaping device.  I have asked him to absolutely stop vaping, since acute lung injury and death may occur.\par No dyspnea on exertion. His back bothers him now, which may also limit his exercise performance. He feels that his sleep is still disturbed. He says he is a mouth breather.\par

## 2019-12-18 NOTE — ASSESSMENT
[FreeTextEntry1] : 12-17-19\par It was a pleasure to see Lucho in follow-up today.  His respiratory issues are summarized:\par \par 1.  Mild obstructive airways disease \par He has stopped smoking. There is a mild obstructive defect on his spirometry today.  He is not using the Utibron prescribed at his last visit. We have discussed with Lucho that there are studies to show that in early COPD medications such as Utibron can slow the rate of decline in lung function. We have recommended he start Utibron at this time. \par  \par 2.    Smoking cessation \par I am happy to see he has very drastically reduced his smoking and have encouraged Lucho to remain away from cigarettes. He states he finds them unappealing now and is not interested in restarting. He is enrolled in the lung cancer screening program (had CT done at [facility] due to insurance costs); his next LCS CT will be due in 10/2020. \par \par 3. Vaping\par We had a serious conversation about the potential harms of vaping and have recommended in no uncertain terms that he stop immediately. \par \par 4. Groundglass opacities at both lung bases\par It was gratifying to see that on the 9/2018 CT that these disappeared while in the prone position and cannot be called fibrotic changes. This is dependent atelectasis which clears in the prone position.\par \par 5. Rheumatoid arthritis\par No evidence of RA-associated ILD on his CT. His serologies are being monitored with rheumatology. \par \par 6. DANIE\par His PSG on 5/25/18 demonstrated an AHI of approximately 10, minimal desaturation (min SpO2 88%).  There is no need to reassess at this stage. \par \par 7. Health maintenance\par Influenza vaccination for the 2151-1776 season administered in the office today.\par \par Return to clinic: 4 months

## 2020-02-15 ENCOUNTER — TRANSCRIPTION ENCOUNTER (OUTPATIENT)
Age: 60
End: 2020-02-15

## 2020-09-08 ENCOUNTER — APPOINTMENT (OUTPATIENT)
Dept: PULMONOLOGY | Facility: CLINIC | Age: 60
End: 2020-09-08
Payer: COMMERCIAL

## 2020-09-08 VITALS
HEART RATE: 76 BPM | OXYGEN SATURATION: 98 % | RESPIRATION RATE: 12 BRPM | TEMPERATURE: 98.5 F | BODY MASS INDEX: 24.52 KG/M2 | DIASTOLIC BLOOD PRESSURE: 70 MMHG | SYSTOLIC BLOOD PRESSURE: 100 MMHG | WEIGHT: 185 LBS | HEIGHT: 73 IN

## 2020-09-08 PROCEDURE — 36415 COLL VENOUS BLD VENIPUNCTURE: CPT

## 2020-09-08 PROCEDURE — 99215 OFFICE O/P EST HI 40 MIN: CPT | Mod: 25

## 2020-09-09 LAB
ALBUMIN SERPL ELPH-MCNC: 4.7 G/DL
ALP BLD-CCNC: 65 U/L
ALT SERPL-CCNC: 33 U/L
ANA SER IF-ACNC: NEGATIVE
ANION GAP SERPL CALC-SCNC: 13 MMOL/L
AST SERPL-CCNC: 21 U/L
BASOPHILS # BLD AUTO: 0.04 K/UL
BASOPHILS NFR BLD AUTO: 0.7 %
BILIRUB SERPL-MCNC: 0.4 MG/DL
BUN SERPL-MCNC: 14 MG/DL
CALCIUM SERPL-MCNC: 9.5 MG/DL
CCP AB SER IA-ACNC: >250 UNITS
CHLORIDE SERPL-SCNC: 104 MMOL/L
CO2 SERPL-SCNC: 23 MMOL/L
CREAT SERPL-MCNC: 0.7 MG/DL
EOSINOPHIL # BLD AUTO: 0.14 K/UL
EOSINOPHIL NFR BLD AUTO: 2.3 %
ERYTHROCYTE [SEDIMENTATION RATE] IN BLOOD BY WESTERGREN METHOD: 26 MM/HR
GLUCOSE SERPL-MCNC: 151 MG/DL
HCT VFR BLD CALC: 48.1 %
HGB BLD-MCNC: 16 G/DL
IMM GRANULOCYTES NFR BLD AUTO: 0.3 %
LYMPHOCYTES # BLD AUTO: 2.29 K/UL
LYMPHOCYTES NFR BLD AUTO: 38.2 %
MAN DIFF?: NORMAL
MCHC RBC-ENTMCNC: 32.6 PG
MCHC RBC-ENTMCNC: 33.3 GM/DL
MCV RBC AUTO: 98 FL
MONOCYTES # BLD AUTO: 0.62 K/UL
MONOCYTES NFR BLD AUTO: 10.3 %
NEUTROPHILS # BLD AUTO: 2.89 K/UL
NEUTROPHILS NFR BLD AUTO: 48.2 %
PLATELET # BLD AUTO: 202 K/UL
POTASSIUM SERPL-SCNC: 4.5 MMOL/L
PROT SERPL-MCNC: 7.5 G/DL
RBC # BLD: 4.91 M/UL
RBC # FLD: 13 %
RF+CCP IGG SER-IMP: ABNORMAL
RHEUMATOID FACT SER QL: 237 IU/ML
SODIUM SERPL-SCNC: 140 MMOL/L
WBC # FLD AUTO: 6 K/UL

## 2020-09-09 NOTE — HISTORY OF PRESENT ILLNESS
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 \par He smokes 1 pack a day since 14 yrs old\par He had failed smoking cessation in the past - he has used nicotine patches and abruptly stopping in the past and has stopped for a few months at a time. He has not tried chantix or other oral pills.\par He denies any cough now, had bronchitis about 1 month ago - this has resolved.\par He denies SOB. \par May have asbestos exposure in his jobs, but he is not sure. \par He is not ready to quit smoking until he comes back from [place] in August. He is not interested in electronic cigarettes.\par Mother  of PE developed after a flight, had a brother  from lung cancer in his 40s\par \par \par 20:\par No longer smoking cigarettes but still using the Juul- about one pod daily. Walking 14 blocks a day and not feeling short of breath on exertion or at rest. 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 \par Still with back pain. Gets cortisone injections every 6 months when the pain starts again (about every 6 months, lasting for a week or so). \par Difficulty falling asleep and staying asleep- last sleep study with AHI 9.8 with 0 minutes spent below 88 was in 2018- has not had repeat sleep study.\par Not taking any inhalers.\par No coughing, wheezing or fevers. \par \par

## 2020-09-09 NOTE — PHYSICAL EXAM
[No Acute Distress] : no acute distress [Normal Oropharynx] : normal oropharynx [Normal Appearance] : normal appearance [Normal Rate/Rhythm] : normal rate/rhythm [No Neck Mass] : no neck mass [No Resp Distress] : no resp distress [Normal S1, S2] : normal s1, s2 [No Murmurs] : no murmurs [Clear to Auscultation Bilaterally] : clear to auscultation bilaterally [No Abnormalities] : no abnormalities [Benign] : benign [Normal Gait] : normal gait [No Cyanosis] : no cyanosis [No Edema] : no edema [FROM] : FROM [Normal Color/ Pigmentation] : normal color/ pigmentation [No Focal Deficits] : no focal deficits [Normal Affect] : normal affect [Oriented x3] : oriented x3 [TextBox_68] : good air entry, no wheezing, rhonchi, crackles, no adventitious sounds  [TextBox_54] : normal S1, S2, no murmurs, rubs, gallops, P2 not loud or split  [TextBox_105] : grade 1 clubbing, mild swelling at the interphalangeal joints, no skin thickening, no palmar erythema

## 2020-09-09 NOTE — CONSULT LETTER
[Please see my note below.] : Please see my note below. [Consult Letter:] : I had the pleasure of evaluating your patient, [unfilled]. [Consult Closing:] : Thank you very much for allowing me to participate in the care of this patient.  If you have any questions, please do not hesitate to contact me. [Sincerely,] : Sincerely, [DrTerrence  ___] : Dr. PELAEZ [Dear  ___] : Dear  [unfilled], [DrTerrence ___] : Dr. PELAEZ [FreeTextEntry3] : Julio Rocha MD [FreeTextEntry2] : Jessica Camarena MD

## 2020-09-09 NOTE — ASSESSMENT
[FreeTextEntry1] : 9-8-20\par It was a pleasure to see Lucho in follow-up today.  His respiratory issues are summarized:\par \par 1.  Mild obstructive airways disease \par He has stopped smoking, but is still vaping. There was a mild obstructive defect on his spirometry back in December 2019. He is not using the Utibron prescribed at his last visit. He can continue with no inhalers for now. Will reassess symptoms next visit as well as repeat PFTs and 6MWT in 3 months.\par  \par 2.    Smoking cessation \par I am happy to see he has very drastically reduced his smoking and have encouraged Lucho to remain away from cigarettes. He states he finds them unappealing now and is not interested in restarting. He is enrolled in the lung cancer screening program (had CT done at [facility] due to insurance costs); his next LCS CT will be due in 10/2020. \par \par 3. Vaping\par We had a serious conversation about the potential harms of vaping and have recommended in no uncertain terms that he stop immediately. Lucho has said he will cut down his usage in half (e.g. using one liquid pod over 2 days) over the next 3 months and then will quit altogether after our next 3 month visit. \par \par 4. Groundglass opacities at both lung bases\par It was gratifying to see that on the 9/2018 CT that these disappeared while in the prone position and cannot be called fibrotic changes. This is dependent atelectasis which clears in the prone position.\par \par 5. Rheumatoid arthritis\par RF has been elevated in the past. No evidence of RA-associated ILD on his CT. Will repeat serologies today (CRP, CCP, RF, MARISSA, ESR as well as CBC, CMP). If serologies abnormal, will refer to Dr. Interiano for follow up. \par \par 6. DANIE\par His PSG on 5/25/18 demonstrated an AHI of approximately 10, minimal desaturation (min SpO2 88%).  ESS today is 7. Will call Lucho in 2 weeks, and if back pain has improved, we will proceed with a repeat home sleep study. \par \par 7. Health maintenance\par Patient due for influenza vaccine for [id number] flu season (our supply has not come in yet- advised patient to get vaccine Mid-September).\par \par Return to clinic: 3 months; will obtain PFTs & 6MWT at that time

## 2020-09-09 NOTE — DISCUSSION/SUMMARY
[FreeTextEntry1] : Attending's summary:\par 9-8-20\par -no oral thrush, MP 4\par -no JVD sitting, no hepatojugular reflux \par -good air entry, no wheezing, rhonchi, crackles, no adventitious sounds \par -normal S1, S2, no murmurs, rubs, gallops, P2 not loud or split \par -no hepatosplenomegaly \par -grade 1 clubbing, mild swelling at the interphalangeal joints, no skin thickening, no palmar erythema\par -no pedal edema  \par \par \par \par

## 2020-09-09 NOTE — PROCEDURE
[FreeTextEntry1] : Spirometry 19: \par FVC: 5.06 L (97%)\par FEV1: 3.37 L (85%)\par FEV1/FVC: 67%\par [id number]%: 2.04 L/s (64%)\par Normal FVC. Mild obstructive defect. \par \par CT chest 10/25/19: There is some mild centrilobular emphysema in the apices. There is cylindrical bronchiectasis. No suspicious lung nodules were appreciated. \par \par Spirometry 19:\par FVC: 4.82 L (92%)\par FEV1: 3.51 L (88%)\par FEV1/FVC: 73%\par KKR45-73%: 2.41 L/s (74%)\par \par Christine, DLCO 18:\par FVC: 4.85 L (99%) --> 5.20 L (107%)\par FEV1: 3.27 L (84%) --> 3.42 L (88%)\par FEV1/FVC: 68% --> 66%\par [id number]%: 1.76 L/s (45%) --> 1.62 L/s (41%) \par DLCO: 26.3 (109%)\par Mild airflow obstruction. Normal diffusion capacity. \par \par EXAM: CT CHEST PROCEDURE DATE: 09/10/2018 \par INTERPRETATION: CT SCAN OF CHEST \par History: Follow-up of groundglass opacities. \par Comparison: Chest CT from 2018 and CT scan of abdomen and pelvis from 2011. \par Findings: \par Lungs and large airways: There is again peripherally located groundglass opacity in the dependent portions of each lower lobe on the supine images. This abnormality resolves on the prone images. Therefore, the groundglass opacity is consistent with dependent atelectasis and not interstitial lung \par disease. \par There is again a small amount of mucus within the trachea. Mild centrilobular emphysema. No change noncalcified micronodules in each upper lobe. No change calcified micronodules in each lower lobe. \par Pleura: No pleural effusion. \par Mediastinum and hilar regions: No thoracic lymphadenopathy. \par Heart and pericardium: Heart size is normal. No pericardial effusion. \par Vessels: Mild coronary artery calcification and mild calcified plaque thoracic aorta. There is again mild thoracic aortic aneurysm, with aortic root measuring 4.2 cm, ascending aorta 3.7 cm, aortic arch 3.5 cm, and descending aorta 3.8 cm. \par Chest wall and lower neck: Normal. \par Upper abdomen: Normal. \par Bones: Degenerative changes right shoulder. \par Impression: The peripherally located groundglass opacities in the dependent portions of each lower lobe on the supine images resolve on the prone images, consistent with dependent atelectasis. No evidence of interstitial lung disease. \par \par Spirometry and DLCO 18:\par FVC: 4.87 L (99%) --> 5.20 L (106%)\par FEV1: 3.31 L (84%) --> 3.37 L (86%)\par FEV1/FVC; 68% --> 65%\par [id number]%: 1.62 L/s (41%) --> 1.53 L/s (39%)\par DLCO: 26.6 (109%)\par Mild obstructive defect.  Normal diffusion capacity.\par \par Attended sleep study 18:\par AHI CMS 9.8\par min SpO2 88%\par PLMS arousal index 2.2\par NSR predominant on ECG\par \par EXAM: CT LDCT LUNG CA SCRN BASELINE   PROCEDURE DATE: 2018 \par History: Current smoker with 44 pack year smoking history. \par Comparison: CT scan of abdomen and pelvis from 2011. \par \par Findings: \par Lungs and airways: Image numbers for description of nodule location refer to thin section axial series number 4. \par 3 mm solid nodule apical segment right upper lobe, image 57. 2 mm pleural-based nodule apical posterior segment left upper lobe, image 50. 2 mm solid nodule apical posterior segment left upper lobe, image 77. \par There is a small amount of mucoid material in the right upper trachea and in the right lower trachea near the raghu. There is bronchial wall thickening bilaterally. There is mild centrilobular emphysema bilaterally. There is again groundglass opacity in the dependent portions of each lower lobe. \par Pleura: The pleural spaces are clear. \par Base of neck, mediastinum and heart: The thyroid gland is normal. No mediastinal, hilar or axillary lymphadenopathy is seen. The heart and pericardium are within normal limits. Small calcified plaque aorta. The thoracic aorta is aneurysmal, with the aortic root measuring 4.1 cm, the ascending aorta 3.9 cm, the aortic arch 3.6 cm, and the descending aorta 3.8 cm. \par Coronary artery calcification: Mild. \par Soft tissues: Normal. \par Abdomen: This study was performed without contrast and with lower than standard dose. These factors reduce sensitivity for detection of small lesions in the upper abdomen. Given these technical limitations, no focal lesion is seen within the visualized organs. \par Bones: There are degenerative changes of the right shoulder. \par \par Impression: 1. There are a few micronodules in the upper lobes. \par 2. Mild emphysema. \par 3. Groundglass opacity in the dependent portion of each lower lobe. This could represent dependent atelectasis. However, similar finding was present on prior study. The differential would include interstitial lung disease. \par 4. Mildly aneurysmal thoracic aorta, measuring up to 4.1 cm in the aortic root. \par \par Lung-RADS category: 2S - Benign appearance or behavior. Nodules with very \par low likelihood of becoming a clinically active cancer due to size or lack of \par growth. Probability of malignancy < 1%. \par \par Recommendation: \par 1. Continue annual screening with LDCT in 12 months. \par 2. Recommend follow-up evaluation of thoracic aortic aneurysm. \par 3. Recommend correlation with pulmonary function tests due to possibility of interstitial lung disease. Recommend prone imaging be performed at time of next CT scan. \par \par 04/10/18 PFT:\par FVC: 4.87L (92%) --> 5.20L (98%)\par FEV1: 3.23L (81%) --> 3.45L (86%)\par FEV1/FVC: 66\par T.24L (125%)\par DLCO: 102%

## 2020-09-09 NOTE — REVIEW OF SYSTEMS
[Back Pain] : back pain [Negative] : Psychiatric [Chills] : no chills [Fever] : no fever [Chest Tightness] : no chest tightness [Cough] : no cough [Dyspnea] : no dyspnea [Wheezing] : no wheezing [Sputum] : no sputum [Edema] : no edema [Chest Discomfort] : no chest discomfort [SOB on Exertion] : no sob on exertion

## 2020-09-11 DIAGNOSIS — Z11.59 ENCOUNTER FOR SCREENING FOR OTHER VIRAL DISEASES: ICD-10-CM

## 2020-09-29 ENCOUNTER — APPOINTMENT (OUTPATIENT)
Dept: SLEEP CENTER | Facility: HOME HEALTH | Age: 60
End: 2020-09-29
Payer: COMMERCIAL

## 2020-09-29 ENCOUNTER — OUTPATIENT (OUTPATIENT)
Dept: OUTPATIENT SERVICES | Facility: HOSPITAL | Age: 60
LOS: 1 days | End: 2020-09-29
Payer: COMMERCIAL

## 2020-09-29 PROCEDURE — 95800 SLP STDY UNATTENDED: CPT | Mod: 26

## 2020-09-29 PROCEDURE — 95800 SLP STDY UNATTENDED: CPT

## 2020-09-30 DIAGNOSIS — G47.33 OBSTRUCTIVE SLEEP APNEA (ADULT) (PEDIATRIC): ICD-10-CM

## 2020-12-07 ENCOUNTER — APPOINTMENT (OUTPATIENT)
Dept: CT IMAGING | Facility: HOSPITAL | Age: 60
End: 2020-12-07

## 2020-12-09 ENCOUNTER — NON-APPOINTMENT (OUTPATIENT)
Age: 60
End: 2020-12-09

## 2020-12-09 LAB — SARS-COV-2 N GENE NPH QL NAA+PROBE: NOT DETECTED

## 2020-12-10 ENCOUNTER — APPOINTMENT (OUTPATIENT)
Dept: PULMONOLOGY | Facility: CLINIC | Age: 60
End: 2020-12-10
Payer: COMMERCIAL

## 2020-12-10 ENCOUNTER — OUTPATIENT (OUTPATIENT)
Dept: OUTPATIENT SERVICES | Facility: HOSPITAL | Age: 60
LOS: 1 days | End: 2020-12-10
Payer: COMMERCIAL

## 2020-12-10 VITALS
SYSTOLIC BLOOD PRESSURE: 127 MMHG | TEMPERATURE: 98.1 F | OXYGEN SATURATION: 95 % | DIASTOLIC BLOOD PRESSURE: 81 MMHG | WEIGHT: 190 LBS | HEART RATE: 83 BPM | HEIGHT: 73 IN | BODY MASS INDEX: 25.18 KG/M2

## 2020-12-10 DIAGNOSIS — F17.200 NICOTINE DEPENDENCE, UNSPECIFIED, UNCOMPLICATED: ICD-10-CM

## 2020-12-10 DIAGNOSIS — R91.8 OTHER NONSPECIFIC ABNORMAL FINDING OF LUNG FIELD: ICD-10-CM

## 2020-12-10 DIAGNOSIS — J44.9 CHRONIC OBSTRUCTIVE PULMONARY DISEASE, UNSPECIFIED: ICD-10-CM

## 2020-12-10 PROCEDURE — 94729 DIFFUSING CAPACITY: CPT | Mod: 26

## 2020-12-10 PROCEDURE — 94760 N-INVAS EAR/PLS OXIMETRY 1: CPT

## 2020-12-10 PROCEDURE — 94729 DIFFUSING CAPACITY: CPT

## 2020-12-10 PROCEDURE — 99072 ADDL SUPL MATRL&STAF TM PHE: CPT

## 2020-12-10 PROCEDURE — 90686 IIV4 VACC NO PRSV 0.5 ML IM: CPT

## 2020-12-10 PROCEDURE — 94618 PULMONARY STRESS TESTING: CPT | Mod: 26

## 2020-12-10 PROCEDURE — G0008: CPT

## 2020-12-10 PROCEDURE — 94060 EVALUATION OF WHEEZING: CPT

## 2020-12-10 PROCEDURE — 94726 PLETHYSMOGRAPHY LUNG VOLUMES: CPT

## 2020-12-10 PROCEDURE — 94618 PULMONARY STRESS TESTING: CPT

## 2020-12-10 PROCEDURE — 94726 PLETHYSMOGRAPHY LUNG VOLUMES: CPT | Mod: 26

## 2020-12-10 PROCEDURE — 99215 OFFICE O/P EST HI 40 MIN: CPT | Mod: 25

## 2020-12-10 PROCEDURE — 94010 BREATHING CAPACITY TEST: CPT | Mod: 26

## 2020-12-10 RX ORDER — ALBUTEROL 90 UG/1
2 AEROSOL, METERED ORAL ONCE
Refills: 0 | Status: DISCONTINUED | OUTPATIENT
Start: 2020-12-10 | End: 2020-12-25

## 2020-12-13 NOTE — REVIEW OF SYSTEMS
[Back Pain] : back pain [Negative] : Endocrine [Fever] : no fever [Chills] : no chills [Cough] : no cough [Chest Tightness] : no chest tightness [Sputum] : no sputum [Dyspnea] : no dyspnea [Wheezing] : no wheezing [SOB on Exertion] : no sob on exertion [Chest Discomfort] : no chest discomfort [Edema] : no edema

## 2020-12-13 NOTE — DISCUSSION/SUMMARY
[FreeTextEntry1] : Attending's summary:\par 12-10-20\par -no pallor or icterus \par -no cervical adenopathy, no supraclavicular adenopathy, no JVD at 45 degrees, no external jugular, no hepatojugular reflux, \par -few early inspiratory crackles both bases approximately 1/3 way up \par -normal S1, S2, no  murmurs, rubs, gallops, P2 not loud or split \par -grade 1 clubbing, no articular manifestations, no skin thickening, no visible rash, calluses on hands\par -no pedal edema \par

## 2020-12-13 NOTE — PROCEDURE
[FreeTextEntry1] : PFT 12/10/20\par FVC: 4.57 L (88%)--> 4.69 L (90%)  \par FEV1: 3.09 L (79%)--> 3.35 L (86%)  \par FEV1/FVC: 67%--> 71%\par GCN16-77%: 1.63L/s (52%)--> 2.25L/s (72%)\par T.68 L (104%)\par RV/T%\par DLCO: (94%)\par Normal spirometry.\par \par 6MWT 12/10/20\par Patient walked 597 meters during a 6 minute walk test.\par Resting O2 saturation was 97% on RA.  Heart rate 78 bpm.\par End test O2 saturation was 96% on RA.  Heart rate 101 bpm.  Eileen scale end of test: 0 "nothing at all"\par This signifies normal walk distance, no desaturation\par \par CT chest 20\par Impression:  Stable mild upper lobe predominant centrilobular emphysema. There is no evidence of interstitial fibrosis. Stable 3 mm subpleural solid nodule in the right upper lobe. No new or suspicious point nodule is identified. Lung-RADS category 2: Benign appearance or behavior. Recommendation: Continue annual screening with low-dose CT in 12 months.\par \par Home sleep study 20: showed minimal sleep disordered breathing with AHI of 5.7 and 0 minutes spent below 89% saturation. Technically good study with adequate sleep time recorded. Total sleep time 5 hrs, 15 minutes. \par \par Spirometry 19: \par FVC: 5.06 L (97%)\par FEV1: 3.37 L (85%)\par FEV1/FVC: 67%\par [id number]%: 2.04 L/s (64%)\par Normal FVC. Mild obstructive defect. \par \par CT chest 10/25/19: There is some mild centrilobular emphysema in the apices. There is cylindrical bronchiectasis. No suspicious lung nodules were appreciated. \par \par Spirometry 19:\par FVC: 4.82 L (92%)\par FEV1: 3.51 L (88%)\par FEV1/FVC: 73%\par PMU47-01%: 2.41 L/s (74%)\par \par Miki, DLCO 18:\par FVC: 4.85 L (99%) --> 5.20 L (107%)\par FEV1: 3.27 L (84%) --> 3.42 L (88%)\par FEV1/FVC: 68% --> 66%\par [id number]%: 1.76 L/s (45%) --> 1.62 L/s (41%) \par DLCO: 26.3 (109%)\par Mild airflow obstruction. Normal diffusion capacity. \par \par EXAM: CT CHEST PROCEDURE DATE: 09/10/2018 \par INTERPRETATION: CT SCAN OF CHEST \par History: Follow-up of groundglass opacities. \par Comparison: Chest CT from 2018 and CT scan of abdomen and pelvis from 2011. \par Findings: \par Lungs and large airways: There is again peripherally located groundglass opacity in the dependent portions of each lower lobe on the supine images. This abnormality resolves on the prone images. Therefore, the groundglass opacity is consistent with dependent atelectasis and not interstitial lung \par disease. \par There is again a small amount of mucus within the trachea. Mild centrilobular emphysema. No change noncalcified micronodules in each upper lobe. No change calcified micronodules in each lower lobe. \par Pleura: No pleural effusion. \par Mediastinum and hilar regions: No thoracic lymphadenopathy. \par Heart and pericardium: Heart size is normal. No pericardial effusion. \par Vessels: Mild coronary artery calcification and mild calcified plaque thoracic aorta. There is again mild thoracic aortic aneurysm, with aortic root measuring 4.2 cm, ascending aorta 3.7 cm, aortic arch 3.5 cm, and descending aorta 3.8 cm. \par Chest wall and lower neck: Normal. \par Upper abdomen: Normal. \par Bones: Degenerative changes right shoulder. \par Impression: The peripherally located groundglass opacities in the dependent portions of each lower lobe on the supine images resolve on the prone images, consistent with dependent atelectasis. No evidence of interstitial lung disease. \par \par Spirometry and DLCO 18:\par FVC: 4.87 L (99%) --> 5.20 L (106%)\par FEV1: 3.31 L (84%) --> 3.37 L (86%)\par FEV1/FVC; 68% --> 65%\par [id number]%: 1.62 L/s (41%) --> 1.53 L/s (39%)\par DLCO: 26.6 (109%)\par Mild obstructive defect.  Normal diffusion capacity.\par \par Attended sleep study 18:\par AHI CMS 9.8\par min SpO2 88%\par PLMS arousal index 2.2\par NSR predominant on ECG\par \par EXAM: CT LDCT LUNG CA SCRN BASELINE   PROCEDURE DATE: 2018 \par History: Current smoker with 44 pack year smoking history. \par Comparison: CT scan of abdomen and pelvis from 2011. \par \par Findings: \par Lungs and airways: Image numbers for description of nodule location refer to thin section axial series number 4. \par 3 mm solid nodule apical segment right upper lobe, image 57. 2 mm pleural-based nodule apical posterior segment left upper lobe, image 50. 2 mm solid nodule apical posterior segment left upper lobe, image 77. \par There is a small amount of mucoid material in the right upper trachea and in the right lower trachea near the raghu. There is bronchial wall thickening bilaterally. There is mild centrilobular emphysema bilaterally. There is again groundglass opacity in the dependent portions of each lower lobe. \par Pleura: The pleural spaces are clear. \par Base of neck, mediastinum and heart: The thyroid gland is normal. No mediastinal, hilar or axillary lymphadenopathy is seen. The heart and pericardium are within normal limits. Small calcified plaque aorta. The thoracic aorta is aneurysmal, with the aortic root measuring 4.1 cm, the ascending aorta 3.9 cm, the aortic arch 3.6 cm, and the descending aorta 3.8 cm. \par Coronary artery calcification: Mild. \par Soft tissues: Normal. \par Abdomen: This study was performed without contrast and with lower than standard dose. These factors reduce sensitivity for detection of small lesions in the upper abdomen. Given these technical limitations, no focal lesion is seen within the visualized organs. \par Bones: There are degenerative changes of the right shoulder. \par \par Impression: 1. There are a few micronodules in the upper lobes. \par 2. Mild emphysema. \par 3. Groundglass opacity in the dependent portion of each lower lobe. This could represent dependent atelectasis. However, similar finding was present on prior study. The differential would include interstitial lung disease. \par 4. Mildly aneurysmal thoracic aorta, measuring up to 4.1 cm in the aortic root. \par \par Lung-RADS category: 2S - Benign appearance or behavior. Nodules with very \par low likelihood of becoming a clinically active cancer due to size or lack of \par growth. Probability of malignancy < 1%. \par \par Recommendation: \par 1. Continue annual screening with LDCT in 12 months. \par 2. Recommend follow-up evaluation of thoracic aortic aneurysm. \par 3. Recommend correlation with pulmonary function tests due to possibility of interstitial lung disease. Recommend prone imaging be performed at time of next CT scan. \par \par 04/10/18 PFT:\par FVC: 4.87L (92%) --> 5.20L (98%)\par FEV1: 3.23L (81%) --> 3.45L (86%)\par FEV1/FVC: 66\par T.24L (125%)\par DLCO: 102%

## 2020-12-13 NOTE — PHYSICAL EXAM
[No Acute Distress] : no acute distress [Normal Oropharynx] : normal oropharynx [Normal Appearance] : normal appearance [No Neck Mass] : no neck mass [Normal Rate/Rhythm] : normal rate/rhythm [Normal S1, S2] : normal s1, s2 [No Murmurs] : no murmurs [No Resp Distress] : no resp distress [Clear to Auscultation Bilaterally] : clear to auscultation bilaterally [No Abnormalities] : no abnormalities [Benign] : benign [Normal Gait] : normal gait [No Cyanosis] : no cyanosis [No Edema] : no edema [FROM] : FROM [Normal Color/ Pigmentation] : normal color/ pigmentation [No Focal Deficits] : no focal deficits [Oriented x3] : oriented x3 [Normal Affect] : normal affect [TextBox_54] : normal S1, S2, no murmurs, rubs, gallops, P2 not loud or split  [TextBox_68] : few early inspiratory crackles both bases approximately 1/3 way up  [TextBox_105] : grade 1 clubbing, no articular manifestations, no skin thickening, no visible rash, calluses on hands

## 2020-12-13 NOTE — HISTORY OF PRESENT ILLNESS
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 \par He smokes 1 pack a day since 14 yrs old\par He had failed smoking cessation in the past - he has used nicotine patches and abruptly stopping in the past and has stopped for a few months at a time. He has not tried chantix or other oral pills.\par He denies any cough now, had bronchitis about 1 month ago - this has resolved.\par He denies SOB. \par May have asbestos exposure in his jobs, but he is not sure. \par He is not ready to quit smoking until he comes back from [place] in August. He is not interested in electronic cigarettes.\par Mother  of PE developed after a flight, had a brother  from lung cancer in his 40s\par \par 12-10-20:\par He has completely stopped vaping\par He does not feel short of breath on exertion or at rest. 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 \par Still with back pain. Gets cortisone injections every 6 months when the pain starts again (about every 6 months, lasting for a week or so). \par Difficulty falling asleep and staying asleep \par Not taking any inhalers.\par No coughing, wheezing or fevers. \par

## 2020-12-13 NOTE — CONSULT LETTER
[Dear  ___] : Dear  [unfilled], [Consult Letter:] : I had the pleasure of evaluating your patient, [unfilled]. [Please see my note below.] : Please see my note below. [Consult Closing:] : Thank you very much for allowing me to participate in the care of this patient.  If you have any questions, please do not hesitate to contact me. [Sincerely,] : Sincerely, [DrTerrence  ___] : Dr. PELAEZ [DrTerrence ___] : Dr. PELAEZ [FreeTextEntry2] : Jessica Camarena MD  [FreeTextEntry3] : Julio Rocha MD

## 2020-12-13 NOTE — ASSESSMENT
[FreeTextEntry1] : 9-8-20\par It was a pleasure to see Lucho in follow-up today.  His respiratory issues are summarized:\par \par 1.  Mild obstructive airways disease \par He has stopped smoking, but is still vaping. There was a mild obstructive defect on his spirometry back in December 2019. He is not using the Utibron prescribed at his last visit. He can continue with no inhalers for now. Repeat PFTs and 6MWT done today shows. PFT 12/10/20 shows mild obstructive defect FVC: 4.57 L (88%), FEV1: 3.09 L (79%), FEV1/FVC: 67%. There is evidence of small airway obstruction with significant bronchodilator response. [id number]%: 1.63L/s (52%)--> 2.25L/s (72%). His spirometry is stable compared to prior spirometry from a year ago. His total lung capacity is normal 104% and normal diffusion capacity 94%. He walked 597 meters on 6MWT, which is considered a normal walk distance. He did not desaturate.\par \par Plan:\par - We will repeat PFT and 6MWT annually\par  \par 2.    Smoking cessation \par I am happy to see he has very drastically reduced his smoking and have encouraged Lucho to remain away from cigarettes. He states he finds them unappealing now and is not interested in restarting. He is enrolled in the lung cancer screening program (had CT done at [facility] due to insurance costs); He had LCS CT on 12/4/20 showing stable benign-appearing pulmonary nodule. Lung-RADS category 2: Benign appearance or behavior. \par \par Plan:\par - Continue annual screening with low-dose CT in 12 months.. \par \par 3. Vaping\par At his last visit, we had a serious conversation about the potential harms of vaping and have recommended in no uncertain terms that he stop immediately. Lucho has said he will cut down his usage in half (e.g. using one liquid pod over 2 days) over the next 3 months and then will quit altogether. Today he reports he has stopped vaping completely.\par \par 4. Groundglass opacities at both lung bases\par It was gratifying to see that on the 9/2018 CT that these disappeared while in the prone position and cannot be called fibrotic changes. This is dependent atelectasis which clears in the prone position.\par \par 5. Rheumatoid arthritis\par RF has been elevated in the past. No evidence of RA-associated ILD on his CT. Repeat serologies on 9/8/20 showed elevated CCP (>250, strong positive), positive RF (237), negative MARISSA, mildly elevated ESR (26), normal CBC and CMP. Given abnormal serologies, we referred him back to Dr. Interiano for follow up but he has not made an appointment.\par \par Plan:\par - He was advised he should schedule an appointment with rheumatologist, Dr. Interiano\par \par 6. DANIE\par His PSG on 5/25/18 demonstrated an AHI of approximately 10, minimal desaturation (min SpO2 88%). Repeat home sleep study showed minimal sleep disordered breathing with AHI of 5.7 and 0 minutes spent below 89% saturation. At this point, we can hold off on CPAP but will continue to monitor [place] Sleepiness Scale score and symptoms We offered patient to see one of our sleep-trained physicians to go over sleep hygiene and other more conservative measures to help improve sleep quality. At this time, he would like to hold off on this. Today his [place] is 7, which is stable.  We will discuss again at next visit.\par \par 7. Health maintenance\par Patient was given the influenza vaccine for 3068-4239 flu season \par \par Return to clinic: 6 months

## 2021-01-11 ENCOUNTER — NON-APPOINTMENT (OUTPATIENT)
Age: 61
End: 2021-01-11

## 2021-01-11 ENCOUNTER — APPOINTMENT (OUTPATIENT)
Dept: HEART AND VASCULAR | Facility: CLINIC | Age: 61
End: 2021-01-11
Payer: COMMERCIAL

## 2021-01-11 VITALS — SYSTOLIC BLOOD PRESSURE: 135 MMHG | DIASTOLIC BLOOD PRESSURE: 72 MMHG | HEART RATE: 60 BPM | OXYGEN SATURATION: 95 %

## 2021-01-11 VITALS
SYSTOLIC BLOOD PRESSURE: 130 MMHG | BODY MASS INDEX: 24.52 KG/M2 | WEIGHT: 185 LBS | DIASTOLIC BLOOD PRESSURE: 76 MMHG | HEIGHT: 73 IN | HEART RATE: 67 BPM

## 2021-01-11 VITALS — SYSTOLIC BLOOD PRESSURE: 130 MMHG | DIASTOLIC BLOOD PRESSURE: 82 MMHG

## 2021-01-11 VITALS — TEMPERATURE: 96 F

## 2021-01-11 PROCEDURE — 99215 OFFICE O/P EST HI 40 MIN: CPT | Mod: 25

## 2021-01-11 PROCEDURE — 99072 ADDL SUPL MATRL&STAF TM PHE: CPT

## 2021-01-11 PROCEDURE — 93000 ELECTROCARDIOGRAM COMPLETE: CPT

## 2021-01-11 PROCEDURE — 93306 TTE W/DOPPLER COMPLETE: CPT

## 2021-01-12 RX ORDER — LISINOPRIL 5 MG/1
5 TABLET ORAL DAILY
Qty: 90 | Refills: 3 | Status: ACTIVE | COMMUNITY
Start: 2018-12-18

## 2021-01-12 RX ORDER — METFORMIN HYDROCHLORIDE 500 MG/1
500 TABLET, COATED ORAL 3 TIMES DAILY
Refills: 0 | Status: DISCONTINUED | COMMUNITY
End: 2021-01-12

## 2021-02-08 ENCOUNTER — APPOINTMENT (OUTPATIENT)
Dept: CT IMAGING | Facility: HOSPITAL | Age: 61
End: 2021-02-08

## 2021-02-09 ENCOUNTER — APPOINTMENT (OUTPATIENT)
Dept: ENDOCRINOLOGY | Facility: CLINIC | Age: 61
End: 2021-02-09
Payer: COMMERCIAL

## 2021-02-09 VITALS
DIASTOLIC BLOOD PRESSURE: 63 MMHG | HEIGHT: 73 IN | RESPIRATION RATE: 14 BRPM | HEART RATE: 76 BPM | SYSTOLIC BLOOD PRESSURE: 108 MMHG | WEIGHT: 185 LBS | OXYGEN SATURATION: 97 % | BODY MASS INDEX: 24.52 KG/M2

## 2021-02-09 LAB
GLUCOSE BLDC GLUCOMTR-MCNC: 109
HBA1C MFR BLD HPLC: 6.6

## 2021-02-09 PROCEDURE — 82962 GLUCOSE BLOOD TEST: CPT

## 2021-02-09 PROCEDURE — 99204 OFFICE O/P NEW MOD 45 MIN: CPT | Mod: 25

## 2021-02-09 PROCEDURE — 83036 HEMOGLOBIN GLYCOSYLATED A1C: CPT | Mod: QW

## 2021-02-09 PROCEDURE — 99072 ADDL SUPL MATRL&STAF TM PHE: CPT

## 2021-02-09 RX ORDER — GLIPIZIDE 5 MG/1
5 TABLET, FILM COATED, EXTENDED RELEASE ORAL DAILY
Qty: 30 | Refills: 0 | Status: DISCONTINUED | COMMUNITY
Start: 2021-01-12 | End: 2021-02-09

## 2021-02-09 NOTE — ASSESSMENT
[FreeTextEntry1] : Diabetes, no known complications.  goal A1c < 7%\par A1c is at goal but he may be having hypoglycemia and I would rather he be on medication that does not cause hypoglycemia and has CV benefit. \par Will change glipizide to Jardiance 10mg/day.  Potential side effects  discussed: increased urination, increased risk of urinary/genital infection, low BP. Advised to drink extra water (avoid dehydration) and empty bladder frequently.  Continue metformin BID.\par Potential complications of diabetes reviewed (blindness, kidney disease, nerve damage) and importance of glucose control discussed to prevent complications.  Test glucose once/day, alternating between fasting (am goal < 130) and bedtime/post dinner (goal < 170). Foot care discussed. \par Advised to avoid sugared drinking, including soda and juice, unless treating hypoglycemia (but now he should not have hypoglycemia since I am stopping glipizide).Suggested changing to seltzer or diet soda but water should be the main beverage.  Limit portions of carbs.\par ophtho referral given.\par RTO 3 months\par

## 2021-02-09 NOTE — HISTORY OF PRESENT ILLNESS
[FreeTextEntry1] : Diabetes diagnosed about 5 years ago. Brother also has diabetes and is followed in this practice.\par Tests sugars in am only at home:  ranges 130-150.   max 240, one time, and he felt "shitty"\par ? may be having hypoglycemia symptoms mid-day, sometimes feels stomach is queasy and he feels anxious\par wife does most of cooking, has meat, veg, sometimes pasta.  not so much takeout. \par LIkes to drink juice or soda once a week.\par has not seen ophtho recently, only OD for glasses, cannot see near\par no polyuria, polydipsia, SOB, chest pain, or neuropathy symptoms \par has nasal symptoms which make sleeping hard at night\par \par PMH: CAD\par diabetes \par \par Meds: \par metformin ER 750mg bid, glipizide ER 5mg/day\par lisinopril 5mg\par aspirin 81mg\par hydroxychloroquine 200mg bid

## 2021-02-09 NOTE — PHYSICAL EXAM
[Alert] : alert [Healthy Appearance] : healthy appearance [No Proptosis] : no proptosis [No Lid Lag] : no lid lag [Normal Hearing] : hearing was normal [No LAD] : no lymphadenopathy [Thyroid Not Enlarged] : the thyroid was not enlarged [Clear to Auscultation] : lungs were clear to auscultation bilaterally [Normal S1, S2] : normal S1 and S2 [Regular Rhythm] : with a regular rhythm [No Edema] : no peripheral edema [Pedal Pulses Normal] : the pedal pulses are present [Normal Sensation on Monofilament Testing] : normal sensation on monofilament testing of lower extremities [Normal Affect] : the affect was normal [Normal Mood] : the mood was normal [Acanthosis Nigricans] : no acanthosis nigricans [Foot Ulcers] : no foot ulcers [de-identified] : fingerstick 109 [de-identified] : no onychomycoses

## 2021-04-28 ENCOUNTER — APPOINTMENT (OUTPATIENT)
Dept: HEART AND VASCULAR | Facility: CLINIC | Age: 61
End: 2021-04-28
Payer: COMMERCIAL

## 2021-04-28 DIAGNOSIS — J44.9 CHRONIC OBSTRUCTIVE PULMONARY DISEASE, UNSPECIFIED: ICD-10-CM

## 2021-04-28 PROCEDURE — 99443: CPT | Mod: 95

## 2021-04-28 RX ORDER — ROSUVASTATIN CALCIUM 5 MG/1
5 TABLET, FILM COATED ORAL
Refills: 0 | Status: ACTIVE | COMMUNITY

## 2021-05-03 PROBLEM — J44.9 COPD (CHRONIC OBSTRUCTIVE PULMONARY DISEASE): Status: ACTIVE | Noted: 2018-04-10

## 2021-05-12 ENCOUNTER — APPOINTMENT (OUTPATIENT)
Dept: ENDOCRINOLOGY | Facility: CLINIC | Age: 61
End: 2021-05-12
Payer: COMMERCIAL

## 2021-05-12 VITALS
BODY MASS INDEX: 24.28 KG/M2 | WEIGHT: 184 LBS | HEART RATE: 80 BPM | DIASTOLIC BLOOD PRESSURE: 65 MMHG | SYSTOLIC BLOOD PRESSURE: 102 MMHG

## 2021-05-12 LAB
HBA1C MFR BLD HPLC: 6.7
HBA1C MFR BLD HPLC: 6.7

## 2021-05-12 PROCEDURE — 83036 HEMOGLOBIN GLYCOSYLATED A1C: CPT | Mod: QW

## 2021-05-12 PROCEDURE — 99214 OFFICE O/P EST MOD 30 MIN: CPT | Mod: 25

## 2021-05-12 PROCEDURE — 99072 ADDL SUPL MATRL&STAF TM PHE: CPT

## 2021-05-12 RX ORDER — METFORMIN ER 750 MG 750 MG/1
750 TABLET ORAL
Qty: 60 | Refills: 5 | Status: DISCONTINUED | COMMUNITY
Start: 2021-01-12 | End: 2021-05-12

## 2021-05-13 NOTE — HISTORY OF PRESENT ILLNESS
[FreeTextEntry1] : had Covid infection Feb 17, still waiting 90 days until he can get vaccine\par Sugars have been higher.   yesterday morning was 165 and recently was 220 after eating.\par has dry mouth more frequently.  no blurry vision.\par Tolerating Jardiance.  no dysuria but urinating more ofetn.\par nocturia up to 3x/night\par no SOB, chest pain or neuropathy symptoms.  more constipated than usual.  has gone 3 days without BM\par has ophtho appointment next month\par \par PMH: CAD\par diabetes \par \par Meds: \par metformin ER 750mg bid, Jardiance 10mg\par lisinopril 5mg\par aspirin 81mg\par hydroxychloroquine 200mg bid\par Previous meds:  glipizide (changed to Jardiance)

## 2021-05-13 NOTE — PHYSICAL EXAM
[Alert] : alert [Healthy Appearance] : healthy appearance [No Proptosis] : no proptosis [No Lid Lag] : no lid lag [Normal Hearing] : hearing was normal [No LAD] : no lymphadenopathy [Thyroid Not Enlarged] : the thyroid was not enlarged [Clear to Auscultation] : lungs were clear to auscultation bilaterally [Normal S1, S2] : normal S1 and S2 [Regular Rhythm] : with a regular rhythm [No Edema] : no peripheral edema [Pedal Pulses Normal] : the pedal pulses are present [Normal Sensation on Monofilament Testing] : normal sensation on monofilament testing of lower extremities [Normal Affect] : the affect was normal [Normal Mood] : the mood was normal [Acanthosis Nigricans] : no acanthosis nigricans [Foot Ulcers] : no foot ulcers [de-identified] : fingerstick 210, 2h pp lunch, heavy lunch (chicken wrap) [de-identified] : no onychomycoses

## 2021-05-13 NOTE — ASSESSMENT
[FreeTextEntry1] : Diabetes, no known complications.  goal A1c < 7%\par Higher sugars may be related to Covid infection?\par Change metformin ER to regular metformin 850mg bid (maybe regular metformin will loosen his stools).\par Continue Jardiance but will not titrate dose since he is having nocturia and increased urination.\par if sugars stay high and he finds cost of Jardiance too high for him, may have to change back to glipizide.\par \par RTO 4 months\par

## 2021-06-10 ENCOUNTER — APPOINTMENT (OUTPATIENT)
Dept: PULMONOLOGY | Facility: CLINIC | Age: 61
End: 2021-06-10
Payer: COMMERCIAL

## 2021-06-10 VITALS
WEIGHT: 184 LBS | DIASTOLIC BLOOD PRESSURE: 78 MMHG | HEART RATE: 85 BPM | SYSTOLIC BLOOD PRESSURE: 114 MMHG | TEMPERATURE: 97.3 F | OXYGEN SATURATION: 95 % | BODY MASS INDEX: 24.39 KG/M2 | HEIGHT: 73 IN

## 2021-06-10 PROCEDURE — 99072 ADDL SUPL MATRL&STAF TM PHE: CPT

## 2021-06-10 PROCEDURE — 99214 OFFICE O/P EST MOD 30 MIN: CPT

## 2021-06-20 NOTE — PROCEDURE
[FreeTextEntry1] : Chest xray 2021: \par Bilateral hazy infiltrate consistent with patient's given history of COVID in 2021. Repeat chest xray later in 2021 with clearing of infiltrates. \par \par  PFT 12/10/20\par FVC: 4.57 L (88%)--> 4.69 L (90%)  \par FEV1: 3.09 L (79%)--> 3.35 L (86%)  \par FEV1/FVC: 67%--> 71%\par [id number]%: 1.63L/s (52%)--> 2.25L/s (72%)\par T.68 L (104%)\par RV/T%\par DLCO: (94%)\par Normal spirometry.\par \par 6MWT 12/10/20\par Patient walked 597 meters during a 6 minute walk test.\par Resting O2 saturation was 97% on RA.  Heart rate 78 bpm.\par End test O2 saturation was 96% on RA.  Heart rate 101 bpm.  Eileen scale end of test: 0 "nothing at all"\par This signifies normal walk distance, no desaturation\par \par CT chest 20\par Impression:  Stable mild upper lobe predominant centrilobular emphysema. There is no evidence of interstitial fibrosis. Stable 3 mm subpleural solid nodule in the right upper lobe. No new or suspicious point nodule is identified. Lung-RADS category 2: Benign appearance or behavior. Recommendation: Continue annual screening with low-dose CT in 12 months.\par \par Home sleep study 20: showed minimal sleep disordered breathing with AHI of 5.7 and 0 minutes spent below 89% saturation. Technically good study with adequate sleep time recorded. Total sleep time 5 hrs, 15 minutes. \par \par Spirometry 19: \par FVC: 5.06 L (97%)\par FEV1: 3.37 L (85%)\par FEV1/FVC: 67%\par GZB76-97%: 2.04 L/s (64%)\par Normal FVC. Mild obstructive defect. \par \par CT chest 10/25/19: There is some mild centrilobular emphysema in the apices. There is cylindrical bronchiectasis. No suspicious lung nodules were appreciated. \par \par Spirometry 19:\par FVC: 4.82 L (92%)\par FEV1: 3.51 L (88%)\par FEV1/FVC: 73%\par GEZ88-65%: 2.41 L/s (74%)\par \par [place], DLCO 18:\par FVC: 4.85 L (99%) --> 5.20 L (107%)\par FEV1: 3.27 L (84%) --> 3.42 L (88%)\par FEV1/FVC: 68% --> 66%\par [id number]%: 1.76 L/s (45%) --> 1.62 L/s (41%) \par DLCO: 26.3 (109%)\par Mild airflow obstruction. Normal diffusion capacity. \par \par EXAM: CT CHEST PROCEDURE DATE: 09/10/2018 \par INTERPRETATION: CT SCAN OF CHEST \par History: Follow-up of groundglass opacities. \par Comparison: Chest CT from 2018 and CT scan of abdomen and pelvis from 2011. \par Findings: \par Lungs and large airways: There is again peripherally located groundglass opacity in the dependent portions of each lower lobe on the supine images. This abnormality resolves on the prone images. Therefore, the groundglass opacity is consistent with dependent atelectasis and not interstitial lung \par disease. \par There is again a small amount of mucus within the trachea. Mild centrilobular emphysema. No change noncalcified micronodules in each upper lobe. No change calcified micronodules in each lower lobe. \par Pleura: No pleural effusion. \par Mediastinum and hilar regions: No thoracic lymphadenopathy. \par Heart and pericardium: Heart size is normal. No pericardial effusion. \par Vessels: Mild coronary artery calcification and mild calcified plaque thoracic aorta. There is again mild thoracic aortic aneurysm, with aortic root measuring 4.2 cm, ascending aorta 3.7 cm, aortic arch 3.5 cm, and descending aorta 3.8 cm. \par Chest wall and lower neck: Normal. \par Upper abdomen: Normal. \par Bones: Degenerative changes right shoulder. \par Impression: The peripherally located groundglass opacities in the dependent portions of each lower lobe on the supine images resolve on the prone images, consistent with dependent atelectasis. No evidence of interstitial lung disease. \par \par Spirometry and DLCO 18:\par FVC: 4.87 L (99%) --> 5.20 L (106%)\par FEV1: 3.31 L (84%) --> 3.37 L (86%)\par FEV1/FVC; 68% --> 65%\par FKN36-12%: 1.62 L/s (41%) --> 1.53 L/s (39%)\par DLCO: 26.6 (109%)\par Mild obstructive defect.  Normal diffusion capacity.\par \par Attended sleep study 18:\par AHI CMS 9.8\par min SpO2 88%\par PLMS arousal index 2.2\par NSR predominant on ECG\par \par EXAM: CT LDCT LUNG CA SCRN BASELINE   PROCEDURE DATE: 2018 \par History: Current smoker with 44 pack year smoking history. \par Comparison: CT scan of abdomen and pelvis from 2011. \par \par Findings: \par Lungs and airways: Image numbers for description of nodule location refer to thin section axial series number 4. \par 3 mm solid nodule apical segment right upper lobe, image 57. 2 mm pleural-based nodule apical posterior segment left upper lobe, image 50. 2 mm solid nodule apical posterior segment left upper lobe, image 77. \par There is a small amount of mucoid material in the right upper trachea and in the right lower trachea near the raghu. There is bronchial wall thickening bilaterally. There is mild centrilobular emphysema bilaterally. There is again groundglass opacity in the dependent portions of each lower lobe. \par Pleura: The pleural spaces are clear. \par Base of neck, mediastinum and heart: The thyroid gland is normal. No mediastinal, hilar or axillary lymphadenopathy is seen. The heart and pericardium are within normal limits. Small calcified plaque aorta. The thoracic aorta is aneurysmal, with the aortic root measuring 4.1 cm, the ascending aorta 3.9 cm, the aortic arch 3.6 cm, and the descending aorta 3.8 cm. \par Coronary artery calcification: Mild. \par Soft tissues: Normal. \par Abdomen: This study was performed without contrast and with lower than standard dose. These factors reduce sensitivity for detection of small lesions in the upper abdomen. Given these technical limitations, no focal lesion is seen within the visualized organs. \par Bones: There are degenerative changes of the right shoulder. \par \par Impression: 1. There are a few micronodules in the upper lobes. \par 2. Mild emphysema. \par 3. Groundglass opacity in the dependent portion of each lower lobe. This could represent dependent atelectasis. However, similar finding was present on prior study. The differential would include interstitial lung disease. \par 4. Mildly aneurysmal thoracic aorta, measuring up to 4.1 cm in the aortic root. \par \par Lung-RADS category: 2S - Benign appearance or behavior. Nodules with very \par low likelihood of becoming a clinically active cancer due to size or lack of \par growth. Probability of malignancy < 1%. \par \par Recommendation: \par 1. Continue annual screening with LDCT in 12 months. \par 2. Recommend follow-up evaluation of thoracic aortic aneurysm. \par 3. Recommend correlation with pulmonary function tests due to possibility of interstitial lung disease. Recommend prone imaging be performed at time of next CT scan. \par \par 04/10/18 PFT:\par FVC: 4.87L (92%) --> 5.20L (98%)\par FEV1: 3.23L (81%) --> 3.45L (86%)\par FEV1/FVC: 66\par T.24L (125%)\par DLCO: 102%

## 2021-06-20 NOTE — ASSESSMENT
[FreeTextEntry1] : 6-10-21:\par It was a pleasure to see Lucho in follow-up today.  His respiratory issues are summarized:\par \par 1.  Mild obstructive airways disease \par He has stopped smoking, but is still vaping. There was a mild obstructive defect on his spirometry back in December 2019. He is not using the Utibron prescribed at his last visit. He can continue with no inhalers for now. Repeat PFTs and 6MWT done today shows. PFT 12/10/20 shows mild obstructive defect FVC: 4.57 L (88%), FEV1: 3.09 L (79%), FEV1/FVC: 67%. There is evidence of small airway obstruction with significant bronchodilator response. [id number]%: 1.63L/s (52%)--> 2.25L/s (72%). His spirometry is stable compared to prior spirometry from a year ago. His total lung capacity is normal 104% and normal diffusion capacity 94%. He walked 597 meters on 6MWT, which is considered a normal walk distance. He did not desaturate.\par \par Plan:\par - We will repeat PFT and 6MWT annually\par  \par 2.    Smoking cessation : no longer smoking\par I am happy to see he has stopped his smoking and have encouraged Lucho to remain away from cigarettes. He states he finds them unappealing now and is not interested in restarting. He is enrolled in the lung cancer screening program (had CT done at [facility] due to insurance costs); He had LCS CT on 12/4/20 showing stable benign-appearing pulmonary nodule. Lung-RADS category 2: Benign appearance or behavior. \par \par Plan:\par - Continue annual screening with low-dose CT in 12 months.\par \par 3. Vaping: quit vaping 4-6 months ago. \par \par \par 4. Groundglass opacities at both lung bases\par It was gratifying to see that on the 9/2018 CT that these disappeared while in the prone position and cannot be called fibrotic changes. This is dependent atelectasis which clears in the prone position.\par \par 5. Rheumatoid arthritis\par RF has been elevated in the past. No evidence of RA-associated ILD on his CT. Repeat serologies on 9/8/20 showed elevated CCP (>250, strong positive), positive RF (237), negative MARISSA, mildly elevated ESR (26), normal CBC and CMP. Given abnormal serologies, we referred him back to rheumatology for follow up but he has not made an appointment.\par \par Plan:\par - He was advised he should schedule an appointment with rheumatologist, Dr. Interiano' replacement. either Dr Esquivel ot Dr Messer. \par \par 6. DANIE\par His PSG on 5/25/18 demonstrated an AHI of approximately 10, minimal desaturation (min SpO2 88%). Repeat home sleep study showed minimal sleep disordered breathing with AHI of 5.7 and 0 minutes spent below 89% saturation. At this point, we can hold off on CPAP but will continue to monitor [place] Sleepiness Scale score and symptoms We offered patient to see one of our sleep-trained physicians to go over sleep hygiene and other more conservative measures to help improve sleep quality. At this time, he would like to hold off on this. Today his [place] is 7, which is stable.  \par \par 7. COVID  infection in march 2021: \par patient reports being sick with COVID in march 2021, wasn't hospitalized and never needed oxygen therapy. He has chest xrays from march which show bilateral infiltrates and their subsequent resolution. He has a recent CT coronary which also shows clearing of infiltrates. He has no residual dyspnea, but has persistent fatigue. \par \par 8. Health maintenance\par Patient was given the influenza vaccine for 0440-1813 flu season \par \par Return to clinic: 4 months

## 2021-06-20 NOTE — DISCUSSION/SUMMARY
[FreeTextEntry1] : Attending's summary:\par 6-10-21:\par -no pallor or icterus \par -no cervical adenopathy, no supraclavicular adenopathy, no JVD, no hepatojugular reflux. no hepatosplenomegaly. \par - +1 clubbing, no cyanosis. mild swelling in PIP.  no thickening of skin on dorsum of hand, no palmar erythema. \par -scratch marks seen on back, good air entry b/l, no wheezing, rhonchi or crackles.  \par -normal S1, S2, no  murmurs, rubs, gallops, P2 not loud or split \par -no pedal edema \par

## 2021-06-20 NOTE — HISTORY OF PRESENT ILLNESS
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 \par He smokes 1 pack a day since 14 yrs old\par He had failed smoking cessation in the past - he has used nicotine patches and abruptly stopping in the past and has stopped for a few months at a time. He has not tried chantix or other oral pills.\par He denies any cough now, had bronchitis about 1 month ago - this has resolved.\par He denies SOB. \par May have asbestos exposure in his jobs, but he is not sure. \par He is not ready to quit smoking until he comes back from [place] in August. He is not interested in electronic cigarettes.\par Mother  of PE developed after a flight, had a brother  from lung cancer in his 40s\par \par 6-10-21:\par patient appears upset that his COVID message was not relayed. He had covid in 2021, not admitted to the hospital. He reports to be feeling better. \par \par 12-10-20:\par He has completely stopped vaping\par He does not feel short of breath on exertion or at rest. 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 \par Still with back pain. Gets cortisone injections every 6 months when the pain starts again (about every 6 months, lasting for a week or so). \par Difficulty falling asleep and staying asleep \par Not taking any inhalers.\par No coughing, wheezing or fevers. \par

## 2021-06-20 NOTE — PHYSICAL EXAM
[TextBox_54] : normal S1, S2, no murmurs, rubs, gallops, P2 not loud or split  [TextBox_68] : few early inspiratory crackles both bases approximately 1/3 way up  [TextBox_105] : grade 1 clubbing, no articular manifestations, no skin thickening, no visible rash, calluses on hands

## 2021-06-20 NOTE — REVIEW OF SYSTEMS
[Fever] : no fever [Chills] : no chills [Cough] : no cough [Chest Tightness] : no chest tightness [Sputum] : no sputum [Dyspnea] : no dyspnea [Wheezing] : no wheezing [SOB on Exertion] : no sob on exertion [Chest Discomfort] : no chest discomfort [Edema] : no edema

## 2021-10-05 ENCOUNTER — APPOINTMENT (OUTPATIENT)
Dept: ENDOCRINOLOGY | Facility: CLINIC | Age: 61
End: 2021-10-05
Payer: COMMERCIAL

## 2021-10-05 VITALS
BODY MASS INDEX: 23.86 KG/M2 | HEIGHT: 73 IN | WEIGHT: 180 LBS | HEART RATE: 76 BPM | SYSTOLIC BLOOD PRESSURE: 107 MMHG | DIASTOLIC BLOOD PRESSURE: 67 MMHG

## 2021-10-05 PROCEDURE — 99214 OFFICE O/P EST MOD 30 MIN: CPT

## 2021-10-05 NOTE — HISTORY OF PRESENT ILLNESS
[FreeTextEntry1] : not taking medications consistently\par When he goes to [place], he doesn't take his meds because he drinks alcohol while in [place] (4-5 beers/day)\par when he takes his meds, then glucose is 140s in the morning\par After returning from [place], glucose was 220 the next morning.\par nocturia up to 3x/night\par no SOB, chest pain or neuropathy symptoms. \par did not see ophtho yet\par vaccinated for Covid.  doesn't want flu vaccine yet.\par \par PMH: CAD\par diabetes \par \par Meds: \par metformin 850mg bid, Jardiance 10mg\par lisinopril 5mg\par aspirin 81mg\par hydroxychloroquine 200mg bid\par Previous meds:  glipizide (changed to Jardiance)

## 2021-10-05 NOTE — ASSESSMENT
[FreeTextEntry1] : Diabetes, no known complications.  goal A1c < 7%\par continue current regimen, advised not to skip meds (he should bring them on vacation).  But if he is drinking, then he can skip the pm metformin dose (ok to take am meds).  In general, he should limit alcohol to 2 drinks/day.\par ophtho referral given\par [facility] form given to check labs\par continue statin therapy\par RTO 4 months\par

## 2021-10-05 NOTE — PHYSICAL EXAM
[Alert] : alert [Healthy Appearance] : healthy appearance [No Proptosis] : no proptosis [No Lid Lag] : no lid lag [Normal Hearing] : hearing was normal [No LAD] : no lymphadenopathy [Thyroid Not Enlarged] : the thyroid was not enlarged [Clear to Auscultation] : lungs were clear to auscultation bilaterally [Normal S1, S2] : normal S1 and S2 [Regular Rhythm] : with a regular rhythm [No Edema] : no peripheral edema [Pedal Pulses Normal] : the pedal pulses are present [Normal Sensation on Monofilament Testing] : normal sensation on monofilament testing of lower extremities [Normal Affect] : the affect was normal [Normal Mood] : the mood was normal [Acanthosis Nigricans] : no acanthosis nigricans [Foot Ulcers] : no foot ulcers [de-identified] : no onychomycoses  [de-identified] : fingerstick 136, 2h post lunch (sandwich)

## 2021-10-07 RX ORDER — BLOOD SUGAR DIAGNOSTIC
STRIP MISCELLANEOUS DAILY
Qty: 50 | Refills: 5 | Status: ACTIVE | COMMUNITY
Start: 2021-10-07 | End: 1900-01-01

## 2021-10-12 ENCOUNTER — APPOINTMENT (OUTPATIENT)
Dept: PULMONOLOGY | Facility: CLINIC | Age: 61
End: 2021-10-12

## 2021-11-22 ENCOUNTER — TRANSCRIPTION ENCOUNTER (OUTPATIENT)
Age: 61
End: 2021-11-22

## 2022-02-08 ENCOUNTER — APPOINTMENT (OUTPATIENT)
Dept: ENDOCRINOLOGY | Facility: CLINIC | Age: 62
End: 2022-02-08
Payer: COMMERCIAL

## 2022-02-08 VITALS
BODY MASS INDEX: 23.88 KG/M2 | SYSTOLIC BLOOD PRESSURE: 107 MMHG | WEIGHT: 181 LBS | DIASTOLIC BLOOD PRESSURE: 67 MMHG | HEART RATE: 82 BPM

## 2022-02-08 LAB — HBA1C MFR BLD HPLC: 6.9

## 2022-02-08 PROCEDURE — 99214 OFFICE O/P EST MOD 30 MIN: CPT | Mod: 25

## 2022-02-08 PROCEDURE — 83036 HEMOGLOBIN GLYCOSYLATED A1C: CPT | Mod: QW

## 2022-02-08 RX ORDER — GLIMEPIRIDE 1 MG/1
1 TABLET ORAL
Qty: 30 | Refills: 0 | Status: ACTIVE | COMMUNITY
Start: 2022-02-08 | End: 1900-01-01

## 2022-02-08 RX ORDER — METFORMIN HYDROCHLORIDE 850 MG/1
850 TABLET, COATED ORAL TWICE DAILY
Qty: 60 | Refills: 5 | Status: ACTIVE | COMMUNITY
Start: 2021-05-12 | End: 1900-01-01

## 2022-02-09 NOTE — PHYSICAL EXAM
[Alert] : alert [Healthy Appearance] : healthy appearance [No Proptosis] : no proptosis [No Lid Lag] : no lid lag [Normal Hearing] : hearing was normal [No LAD] : no lymphadenopathy [Thyroid Not Enlarged] : the thyroid was not enlarged [Clear to Auscultation] : lungs were clear to auscultation bilaterally [Normal S1, S2] : normal S1 and S2 [Regular Rhythm] : with a regular rhythm [No Edema] : no peripheral edema [Pedal Pulses Normal] : the pedal pulses are present [Normal Sensation on Monofilament Testing] : normal sensation on monofilament testing of lower extremities [Normal Affect] : the affect was normal [Normal Mood] : the mood was normal [Acanthosis Nigricans] : no acanthosis nigricans [Foot Ulcers] : no foot ulcers [de-identified] : fingerstick 181, 3h post lunch (turkey sandwich) [de-identified] : no onychomycoses

## 2022-02-09 NOTE — DATA REVIEWED
[FreeTextEntry1] : 2/22 : A1c 6.9% POC\par 10/21: A1c 7.1%, tot chol 194, HDL 55, trig 198, , urine alb/cr 15, TSH 0.40\par 5/21 A1c 6.7% (point of care)\par 3/21: A1c 7.7%\par 2/21: A1c 6.6%

## 2022-02-09 NOTE — HISTORY OF PRESENT ILLNESS
[FreeTextEntry1] : Earlier today, got steroid injection near shoulder (but into muscle).  Had already taken Medrol pack 1-2 weeks ago for this pain (not effective).   Pain occurred after using jackhammer.\par Sugars 120-130s in the mornings.  Did not test today or yesterday but it seems they did not increase while taking Medrol pack.\par no SOB, chest pain or neuropathy symptoms. \par did not see ophtho yet, says whenever he calls he is told he has the wrong doctor, they don't do the eye exam he is looking for.\par \par PMH: CAD\par diabetes \par \par Meds: \par metformin 850mg bid, Jardiance 10mg\par lisinopril 5mg\par aspirin 81mg\par hydroxychloroquine 200mg bid\par cyclobenzaprine prn\par Previous meds:  glipizide (changed to Jardiance)

## 2022-02-09 NOTE — ASSESSMENT
[FreeTextEntry1] : Diabetes, no known complications.  goal A1c < 7%\par Continue metformin and Jardiance.  I anticipate sugars will increase due to steroids.\par Recommended adding glimepiride 1mg with first meal of the day for 1 week  following steroid injection, or if am glucose over 140\par Will reach out to ophtho to aid scheduling appointment.\par RTO 4 months\par

## 2022-03-14 ENCOUNTER — APPOINTMENT (OUTPATIENT)
Dept: HEART AND VASCULAR | Facility: CLINIC | Age: 62
End: 2022-03-14
Payer: COMMERCIAL

## 2022-03-14 DIAGNOSIS — R53.81 OTHER MALAISE: ICD-10-CM

## 2022-03-14 DIAGNOSIS — G47.33 OBSTRUCTIVE SLEEP APNEA (ADULT) (PEDIATRIC): ICD-10-CM

## 2022-03-14 DIAGNOSIS — R53.83 OTHER MALAISE: ICD-10-CM

## 2022-03-14 PROCEDURE — 99213 OFFICE O/P EST LOW 20 MIN: CPT | Mod: 95

## 2022-03-14 RX ORDER — ASPIRIN 81 MG/1
81 TABLET, COATED ORAL
Qty: 90 | Refills: 3 | Status: ACTIVE | COMMUNITY
Start: 2021-01-11 | End: 1900-01-01

## 2022-03-22 ENCOUNTER — APPOINTMENT (OUTPATIENT)
Dept: HEART AND VASCULAR | Facility: CLINIC | Age: 62
End: 2022-03-22
Payer: COMMERCIAL

## 2022-03-22 VITALS
TEMPERATURE: 98.6 F | DIASTOLIC BLOOD PRESSURE: 70 MMHG | HEIGHT: 73 IN | HEART RATE: 72 BPM | SYSTOLIC BLOOD PRESSURE: 110 MMHG | WEIGHT: 178 LBS | BODY MASS INDEX: 23.59 KG/M2

## 2022-03-22 DIAGNOSIS — M06.9 RHEUMATOID ARTHRITIS, UNSPECIFIED: ICD-10-CM

## 2022-03-22 DIAGNOSIS — E11.65 TYPE 2 DIABETES MELLITUS WITH HYPERGLYCEMIA: ICD-10-CM

## 2022-03-22 DIAGNOSIS — I10 ESSENTIAL (PRIMARY) HYPERTENSION: ICD-10-CM

## 2022-03-22 DIAGNOSIS — M54.14 RADICULOPATHY, THORACIC REGION: ICD-10-CM

## 2022-03-22 DIAGNOSIS — R06.00 DYSPNEA, UNSPECIFIED: ICD-10-CM

## 2022-03-22 DIAGNOSIS — I73.9 PERIPHERAL VASCULAR DISEASE, UNSPECIFIED: ICD-10-CM

## 2022-03-22 DIAGNOSIS — R07.9 CHEST PAIN, UNSPECIFIED: ICD-10-CM

## 2022-03-22 DIAGNOSIS — I25.10 ATHEROSCLEROTIC HEART DISEASE OF NATIVE CORONARY ARTERY W/OUT ANGINA PECTORIS: ICD-10-CM

## 2022-03-22 DIAGNOSIS — E78.5 HYPERLIPIDEMIA, UNSPECIFIED: ICD-10-CM

## 2022-03-22 PROCEDURE — 99214 OFFICE O/P EST MOD 30 MIN: CPT | Mod: 25

## 2022-03-22 PROCEDURE — 93306 TTE W/DOPPLER COMPLETE: CPT

## 2022-03-22 PROCEDURE — 93000 ELECTROCARDIOGRAM COMPLETE: CPT

## 2022-03-23 PROBLEM — R07.9 CHEST PAIN AT REST: Status: ACTIVE | Noted: 2021-01-11

## 2022-03-23 PROBLEM — R06.00 DOE (DYSPNEA ON EXERTION): Status: ACTIVE | Noted: 2022-03-14

## 2022-03-23 PROBLEM — M06.9 RHEUMATOID ARTHRITIS: Status: ACTIVE | Noted: 2018-01-02

## 2022-03-23 PROBLEM — I73.9 PAD (PERIPHERAL ARTERY DISEASE): Status: ACTIVE | Noted: 2018-01-02

## 2022-03-23 PROBLEM — E11.65 TYPE 2 DIABETES MELLITUS WITH HYPERGLYCEMIA, WITHOUT LONG-TERM CURRENT USE OF INSULIN: Status: ACTIVE | Noted: 2018-01-02

## 2022-03-23 PROBLEM — M54.14 THORACIC RADICULITIS: Status: ACTIVE | Noted: 2022-03-23

## 2022-03-23 PROBLEM — I10 BENIGN HYPERTENSION: Status: ACTIVE | Noted: 2018-01-02

## 2022-03-23 PROBLEM — E78.5 HYPERLIPIDEMIA: Status: ACTIVE | Noted: 2021-01-11

## 2022-03-23 PROBLEM — I25.10 NON-OCCLUSIVE CORONARY ARTERY DISEASE: Status: ACTIVE | Noted: 2021-01-11

## 2022-04-12 NOTE — END OF VISIT
[Time Spent: ___ minutes] : I have spent [unfilled] minutes of time on the encounter. [>50% of the face to face encounter time was spent on counseling and/or coordination of care for ___] : Greater than 50% of the face to face encounter time was spent on counseling and/or coordination of care for [unfilled] Xray Wrist 3 Views, Left

## 2022-05-05 ENCOUNTER — RX RENEWAL (OUTPATIENT)
Age: 62
End: 2022-05-05

## 2022-05-05 RX ORDER — EMPAGLIFLOZIN 10 MG/1
10 TABLET, FILM COATED ORAL
Qty: 90 | Refills: 1 | Status: ACTIVE | COMMUNITY
Start: 2021-02-09 | End: 1900-01-01

## 2022-09-20 ENCOUNTER — APPOINTMENT (OUTPATIENT)
Dept: HEART AND VASCULAR | Facility: CLINIC | Age: 62
End: 2022-09-20

## 2022-09-20 ENCOUNTER — APPOINTMENT (OUTPATIENT)
Dept: ENDOCRINOLOGY | Facility: CLINIC | Age: 62
End: 2022-09-20